# Patient Record
Sex: FEMALE | Race: BLACK OR AFRICAN AMERICAN | NOT HISPANIC OR LATINO | Employment: OTHER | URBAN - METROPOLITAN AREA
[De-identification: names, ages, dates, MRNs, and addresses within clinical notes are randomized per-mention and may not be internally consistent; named-entity substitution may affect disease eponyms.]

---

## 2019-10-14 ENCOUNTER — APPOINTMENT (EMERGENCY)
Dept: RADIOLOGY | Facility: HOSPITAL | Age: 64
End: 2019-10-14
Payer: MEDICARE

## 2019-10-14 ENCOUNTER — HOSPITAL ENCOUNTER (EMERGENCY)
Facility: HOSPITAL | Age: 64
Discharge: HOME | End: 2019-10-14
Attending: EMERGENCY MEDICINE | Admitting: EMERGENCY MEDICINE
Payer: MEDICARE

## 2019-10-14 VITALS
DIASTOLIC BLOOD PRESSURE: 74 MMHG | SYSTOLIC BLOOD PRESSURE: 153 MMHG | HEART RATE: 100 BPM | OXYGEN SATURATION: 100 % | TEMPERATURE: 98.3 F | RESPIRATION RATE: 15 BRPM

## 2019-10-14 DIAGNOSIS — L02.92 FURUNCLE: Primary | ICD-10-CM

## 2019-10-14 LAB
ALBUMIN SERPL BCP-MCNC: 4.1 G/DL (ref 3.5–5)
ALP SERPL-CCNC: 57 U/L (ref 46–116)
ALT SERPL W P-5'-P-CCNC: 15 U/L (ref 12–78)
ANION GAP SERPL CALCULATED.3IONS-SCNC: 11 MMOL/L (ref 4–13)
AST SERPL W P-5'-P-CCNC: 19 U/L (ref 5–45)
ATRIAL RATE: 88 BPM
ATRIAL RATE: 93 BPM
BASOPHILS # BLD AUTO: 0.03 THOUSANDS/ΜL (ref 0–0.1)
BASOPHILS NFR BLD AUTO: 1 % (ref 0–1)
BILIRUB SERPL-MCNC: 0.4 MG/DL (ref 0.2–1)
BUN SERPL-MCNC: 12 MG/DL (ref 5–25)
CALCIUM SERPL-MCNC: 9.5 MG/DL (ref 8.3–10.1)
CHLORIDE SERPL-SCNC: 104 MMOL/L (ref 100–108)
CO2 SERPL-SCNC: 26 MMOL/L (ref 21–32)
CREAT SERPL-MCNC: 0.93 MG/DL (ref 0.6–1.3)
EOSINOPHIL # BLD AUTO: 0.44 THOUSAND/ΜL (ref 0–0.61)
EOSINOPHIL NFR BLD AUTO: 8 % (ref 0–6)
ERYTHROCYTE [DISTWIDTH] IN BLOOD BY AUTOMATED COUNT: 13.8 % (ref 11.6–15.1)
GFR SERPL CREATININE-BSD FRML MDRD: 65 ML/MIN/1.73SQ M
GLUCOSE SERPL-MCNC: 102 MG/DL (ref 65–140)
HCT VFR BLD AUTO: 36.2 % (ref 34.8–46.1)
HGB BLD-MCNC: 11.3 G/DL (ref 11.5–15.4)
IMM GRANULOCYTES # BLD AUTO: 0.01 THOUSAND/UL (ref 0–0.2)
IMM GRANULOCYTES NFR BLD AUTO: 0 % (ref 0–2)
LYMPHOCYTES # BLD AUTO: 1.93 THOUSANDS/ΜL (ref 0.6–4.47)
LYMPHOCYTES NFR BLD AUTO: 34 % (ref 14–44)
MCH RBC QN AUTO: 28.5 PG (ref 26.8–34.3)
MCHC RBC AUTO-ENTMCNC: 31.2 G/DL (ref 31.4–37.4)
MCV RBC AUTO: 91 FL (ref 82–98)
MONOCYTES # BLD AUTO: 0.45 THOUSAND/ΜL (ref 0.17–1.22)
MONOCYTES NFR BLD AUTO: 8 % (ref 4–12)
NEUTROPHILS # BLD AUTO: 2.88 THOUSANDS/ΜL (ref 1.85–7.62)
NEUTS SEG NFR BLD AUTO: 49 % (ref 43–75)
NRBC BLD AUTO-RTO: 0 /100 WBCS
NT-PROBNP SERPL-MCNC: 148 PG/ML
P AXIS: 66 DEGREES
P AXIS: 74 DEGREES
PLATELET # BLD AUTO: 294 THOUSANDS/UL (ref 149–390)
PMV BLD AUTO: 9.8 FL (ref 8.9–12.7)
POTASSIUM SERPL-SCNC: 4.1 MMOL/L (ref 3.5–5.3)
PR INTERVAL: 160 MS
PR INTERVAL: 160 MS
PROT SERPL-MCNC: 8.5 G/DL (ref 6.4–8.2)
QRS AXIS: 27 DEGREES
QRS AXIS: 34 DEGREES
QRSD INTERVAL: 72 MS
QRSD INTERVAL: 72 MS
QT INTERVAL: 360 MS
QT INTERVAL: 374 MS
QTC INTERVAL: 447 MS
QTC INTERVAL: 452 MS
RBC # BLD AUTO: 3.96 MILLION/UL (ref 3.81–5.12)
SODIUM SERPL-SCNC: 141 MMOL/L (ref 136–145)
T WAVE AXIS: 58 DEGREES
T WAVE AXIS: 63 DEGREES
TROPONIN I SERPL-MCNC: <0.02 NG/ML
VENTRICULAR RATE: 88 BPM
VENTRICULAR RATE: 93 BPM
WBC # BLD AUTO: 5.74 THOUSAND/UL (ref 4.31–10.16)

## 2019-10-14 PROCEDURE — 99284 EMERGENCY DEPT VISIT MOD MDM: CPT

## 2019-10-14 PROCEDURE — 71046 X-RAY EXAM CHEST 2 VIEWS: CPT

## 2019-10-14 PROCEDURE — 99283 EMERGENCY DEPT VISIT LOW MDM: CPT | Performed by: EMERGENCY MEDICINE

## 2019-10-14 PROCEDURE — 84484 ASSAY OF TROPONIN QUANT: CPT | Performed by: EMERGENCY MEDICINE

## 2019-10-14 PROCEDURE — 36415 COLL VENOUS BLD VENIPUNCTURE: CPT | Performed by: EMERGENCY MEDICINE

## 2019-10-14 PROCEDURE — 80053 COMPREHEN METABOLIC PANEL: CPT | Performed by: EMERGENCY MEDICINE

## 2019-10-14 PROCEDURE — 85025 COMPLETE CBC W/AUTO DIFF WBC: CPT | Performed by: EMERGENCY MEDICINE

## 2019-10-14 PROCEDURE — 93010 ELECTROCARDIOGRAM REPORT: CPT | Performed by: INTERNAL MEDICINE

## 2019-10-14 PROCEDURE — 93005 ELECTROCARDIOGRAM TRACING: CPT

## 2019-10-14 PROCEDURE — 83880 ASSAY OF NATRIURETIC PEPTIDE: CPT | Performed by: EMERGENCY MEDICINE

## 2019-10-14 RX ORDER — CEPHALEXIN 500 MG/1
500 CAPSULE ORAL EVERY 6 HOURS SCHEDULED
Qty: 28 CAPSULE | Refills: 0 | Status: SHIPPED | OUTPATIENT
Start: 2019-10-14 | End: 2019-10-21

## 2019-10-14 RX ORDER — PIOGLITAZONEHYDROCHLORIDE 30 MG/1
30 TABLET ORAL DAILY
COMMUNITY
Start: 2019-04-17

## 2019-10-14 RX ORDER — SIMVASTATIN 40 MG
TABLET ORAL
COMMUNITY
Start: 2019-10-14

## 2019-10-14 RX ORDER — METFORMIN HYDROCHLORIDE 500 MG/1
TABLET, EXTENDED RELEASE ORAL
COMMUNITY
Start: 2019-10-14

## 2019-10-14 NOTE — ED PROVIDER NOTES
History  Chief Complaint   Patient presents with    Cyst     pt has a cyst on her right sholder, states it has had some drainage     71-year-old female presenting to the emergency department for evaluation of cyst on her back  Patient was trying on dress this with her daughter when they noticed a draining cyst on her right shoulder blade  Did drain some pus  There has been no fevers chills redness or swelling of the skin around it  Patient is diabetic  Also secondary complaint here is leg swelling  Patient has had some leg swelling worse at the end of the day for the past several months, slowly getting worse  No injury  No calf tenderness  No chest pain palpitations or shortness of breath  No orthopnea or dyspnea on exertion  Prior to Admission Medications   Prescriptions Last Dose Informant Patient Reported? Taking?   metFORMIN (GLUCOPHAGE-XR) 500 mg 24 hr tablet   Yes Yes   Sig: TAKE 4 TABLETS ONCE DAILY   pioglitazone (ACTOS) 30 mg tablet   Yes Yes   Sig: Take 30 mg by mouth daily   simvastatin (ZOCOR) 40 mg tablet   Yes Yes   Sig: TAKE 1 TABLET NIGHTLY      Facility-Administered Medications: None       Past Medical History:   Diagnosis Date    Diabetes mellitus ([facility] )        History reviewed  No pertinent surgical history  History reviewed  No pertinent family history  I have reviewed and agree with the history as documented  Social History     Tobacco Use    Smoking status: Never Smoker    Smokeless tobacco: Never Used   Substance Use Topics    Alcohol use: Never     Frequency: Never    Drug use: Never        Review of Systems   Cardiovascular: Positive for leg swelling  Skin: Positive for wound  Physical Exam  Physical Exam   Constitutional: She is oriented to person, place, and time  She appears well-developed and well-nourished  No distress  HENT:   Head: Normocephalic and atraumatic     Mouth/Throat: Oropharynx is clear and moist    Eyes: Pupils are equal, round, and reactive to light  EOM are normal    Neck: No JVD present  No tracheal deviation present  Cardiovascular: Normal rate, regular rhythm, normal heart sounds and intact distal pulses  Exam reveals no gallop and no friction rub  No murmur heard  Pulmonary/Chest: Effort normal and breath sounds normal  No respiratory distress  She has no wheezes  She has no rales  Abdominal: Soft  Bowel sounds are normal  She exhibits no distension  There is no tenderness  There is no rebound and no guarding  Neurological: She is alert and oriented to person, place, and time  No cranial nerve deficit  She exhibits normal muscle tone  Skin: Skin is warm and dry  She is not diaphoretic  No pallor  Psychiatric: She has a normal mood and affect  Her behavior is normal    Nursing note and vitals reviewed        Vital Signs  ED Triage Vitals [10/14/19 1257]   Temperature Pulse Respirations Blood Pressure SpO2   98 3 °F (36 8 °C) 100 15 153/74 100 %      Temp Source Heart Rate Source Patient Position - Orthostatic VS BP Location FiO2 (%)   Oral Monitor Sitting Left arm --      Pain Score       --           Vitals:    10/14/19 1257   BP: 153/74   Pulse: 100   Patient Position - Orthostatic VS: Sitting         Visual Acuity      ED Medications  Medications - No data to display    Diagnostic Studies  Results Reviewed     Procedure Component Value Units Date/Time    NT-BNP PRO [[id number]]  (Abnormal) Collected:  10/14/19 1435    Lab Status:  Final result Specimen:  Blood from Arm, Right Updated:  10/14/19 1506     NT-proBNP 148 pg/mL     Troponin I [[id number]]  (Normal) Collected:  10/14/19 1435    Lab Status:  Final result Specimen:  Blood from Arm, Right Updated:  10/14/19 1503     Troponin I <0 02 ng/mL     Comprehensive metabolic panel [[id number]]  (Abnormal) Collected:  10/14/19 1435    Lab Status:  Final result Specimen:  Blood from Arm, Right Updated:  10/14/19 1500     Sodium 141 mmol/L      Potassium 4 1 mmol/L Chloride 104 mmol/L      CO2 26 mmol/L      ANION GAP 11 mmol/L      BUN 12 mg/dL      Creatinine 0 93 mg/dL      Glucose 102 mg/dL      Calcium 9 5 mg/dL      AST 19 U/L      ALT 15 U/L      Alkaline Phosphatase 57 U/L      Total Protein 8 5 g/dL      Albumin 4 1 g/dL      Total Bilirubin 0 40 mg/dL      eGFR 65 ml/min/1 73sq m     Narrative:       National Kidney Disease Foundation guidelines for Chronic Kidney Disease (CKD):     Stage 1 with normal or high GFR (GFR > 90 mL/min/1 73 square meters)    Stage 2 Mild CKD (GFR = 60-89 mL/min/1 73 square meters)    Stage 3A Moderate CKD (GFR = 45-59 mL/min/1 73 square meters)    Stage 3B Moderate CKD (GFR = 30-44 mL/min/1 73 square meters)    Stage 4 Severe CKD (GFR = 15-29 mL/min/1 73 square meters)    Stage 5 End Stage CKD (GFR <15 mL/min/1 73 square meters)  Note: GFR calculation is accurate only with a steady state creatinine    CBC and differential [[id number]]  (Abnormal) Collected:  10/14/19 1435    Lab Status:  Final result Specimen:  Blood from Arm, Right Updated:  10/14/19 1442     WBC 5 74 Thousand/uL      RBC 3 96 Million/uL      Hemoglobin 11 3 g/dL      Hematocrit 36 2 %      MCV 91 fL      MCH 28 5 pg      MCHC 31 2 g/dL      RDW 13 8 %      MPV 9 8 fL      Platelets 242 Thousands/uL      nRBC 0 /100 WBCs      Neutrophils Relative 49 %      Immat GRANS % 0 %      Lymphocytes Relative 34 %      Monocytes Relative 8 %      Eosinophils Relative 8 %      Basophils Relative 1 %      Neutrophils Absolute 2 88 Thousands/µL      Immature Grans Absolute 0 01 Thousand/uL      Lymphocytes Absolute 1 93 Thousands/µL      Monocytes Absolute 0 45 Thousand/µL      Eosinophils Absolute 0 44 Thousand/µL      Basophils Absolute 0 03 Thousands/µL                  XR chest 2 views    (Results Pending)              Procedures  Procedures       ED Course                               MDM  Number of Diagnoses or Management Options  Furuncle:   Diagnosis management comments: 41-year-old with furuncle, there is no deeper abscess on ultrasound eval, will place on antibiotics given the fact she is diabetic  Will also check EKG, labs, chest x-ray, BNP given her leg swelling  If normal will discharge with PCP follow-up  Disposition  Final diagnoses:   Furuncle     Time reflects when diagnosis was documented in both MDM as applicable and the Disposition within this note     Time User Action Codes Description Comment    10/14/2019  3:19 PM Hakan Sandoval Add [R29 2] Reflex abnormality     10/14/2019  3:19 PM Morenita Sandoval Remove [R29 2] Reflex abnormality     10/14/2019  3:19 PM Lori, [address] [place] [address] [L02 92] Furuncle       ED Disposition     ED Disposition Condition Date/Time Comment    Discharge Stable Mon Oct 14, 2019  3:18 PM Venkata Peraza discharge to home/self care  Follow-up Information     Follow up With Specialties Details Why Contact Info    Fang Colon MD Internal Medicine   [address] [place] [address]  [phone]            Patient's Medications   Discharge Prescriptions    CEPHALEXIN (KEFLEX) 500 MG CAPSULE    Take 1 capsule (500 mg total) by mouth every 6 (six) hours for 7 days       Start Date: 10/14/2019End Date: 10/21/2019       Order Dose: 500 mg       Quantity: 28 capsule    Refills: 0     No discharge procedures on file      ED Provider  Electronically Signed by           Jarad Liu MD  10/14/19 2674

## 2020-01-08 ENCOUNTER — HOSPITAL ENCOUNTER (EMERGENCY)
Facility: HOSPITAL | Age: 65
Discharge: HOME | End: 2020-01-08
Attending: EMERGENCY MEDICINE
Payer: COMMERCIAL

## 2020-01-08 VITALS
DIASTOLIC BLOOD PRESSURE: 67 MMHG | HEIGHT: 63 IN | OXYGEN SATURATION: 99 % | TEMPERATURE: 98.1 F | RESPIRATION RATE: 17 BRPM | HEART RATE: 88 BPM | WEIGHT: 167 LBS | BODY MASS INDEX: 29.59 KG/M2 | SYSTOLIC BLOOD PRESSURE: 140 MMHG

## 2020-01-08 DIAGNOSIS — L25.9 CONTACT DERMATITIS: ICD-10-CM

## 2020-01-08 DIAGNOSIS — L50.9 URTICARIA: Primary | ICD-10-CM

## 2020-01-08 PROCEDURE — 99282 EMERGENCY DEPT VISIT SF MDM: CPT

## 2020-01-08 PROCEDURE — 99284 EMERGENCY DEPT VISIT MOD MDM: CPT | Performed by: EMERGENCY MEDICINE

## 2020-01-08 RX ORDER — HYDROCORTISONE 25 MG/ML
LOTION TOPICAL 2 TIMES DAILY
Qty: 50 ML | Refills: 1 | Status: ON HOLD | OUTPATIENT
Start: 2020-01-08 | End: 2020-11-29 | Stop reason: CLARIF

## 2020-01-08 RX ORDER — HYDROXYZINE HYDROCHLORIDE 25 MG/1
25 TABLET, FILM COATED ORAL EVERY 6 HOURS
Qty: 20 TABLET | Refills: 0 | Status: SHIPPED | OUTPATIENT
Start: 2020-01-08

## 2020-01-08 NOTE — ED PROVIDER NOTES
History  Chief Complaint   Patient presents with    Rash     Patient c/o rash on both thighs along with left shoulder that started last week  71 y o  F presents with a rash to bilateral thighs  This is itchy  Appears consistent with urticaria  She has no evidence of systemic anaphylaxis:  She has no shortness of breath, no wheezing, no abdominal symptoms, no nausea or vomiting  No airway constriction  She (in a disagreement with nursing note) has this rash no where else  No history of similar in the past   She admits that she bought new long underwear and that is when the symptoms started  Prior to Admission Medications   Prescriptions Last Dose Informant Patient Reported? Taking?   metFORMIN (GLUCOPHAGE-XR) 500 mg 24 hr tablet   Yes No   Sig: TAKE 4 TABLETS ONCE DAILY   pioglitazone (ACTOS) 30 mg tablet   Yes No   Sig: Take 30 mg by mouth daily   simvastatin (ZOCOR) 40 mg tablet   Yes No   Sig: TAKE 1 TABLET NIGHTLY      Facility-Administered Medications: None       Past Medical History:   Diagnosis Date    Diabetes mellitus ([facility] )        History reviewed  No pertinent surgical history  History reviewed  No pertinent family history  I have reviewed and agree with the history as documented  Social History     Tobacco Use    Smoking status: Never Smoker    Smokeless tobacco: Never Used   Substance Use Topics    Alcohol use: Never     Frequency: Never    Drug use: Never        Review of Systems   Constitutional: Negative for chills and fever  HENT: Negative for trouble swallowing  Respiratory: Negative for cough, shortness of breath and stridor  Gastrointestinal: Negative for abdominal pain, nausea and vomiting  Musculoskeletal: Negative for back pain and neck pain  Skin: Positive for rash (Urticaria bilateral thighs)  Negative for wound  Allergic/Immunologic:        Patient does not have known environmental allergies     All other systems reviewed and are negative  Physical Exam  Physical Exam   Constitutional: She is oriented to person, place, and time  She appears well-developed and well-nourished  No distress  HENT:   Head: Normocephalic and atraumatic  Mouth/Throat: Oropharynx is clear and moist    Eyes: Conjunctivae and EOM are normal    Neck: Normal range of motion  Pulmonary/Chest: Effort normal  No respiratory distress  Musculoskeletal: Normal range of motion  Neurological: She is alert and oriented to person, place, and time  No cranial nerve deficit  Skin: Skin is warm and dry  Rash (Urticaria to bilateral thighs, itchy ) noted  She is not diaphoretic  No pallor  Psychiatric: She has a normal mood and affect  Her behavior is normal    Vitals reviewed  Vital Signs  ED Triage Vitals [01/08/20 1047]   Temperature Pulse Respirations Blood Pressure SpO2   98 1 °F (36 7 °C) 88 17 140/67 99 %      Temp Source Heart Rate Source Patient Position - Orthostatic VS BP Location FiO2 (%)   Oral Monitor -- -- --      Pain Score       No Pain           Vitals:    01/08/20 1047   BP: 140/67   Pulse: 88         Visual Acuity      ED Medications  Medications - No data to display    Diagnostic Studies  Results Reviewed     None                 No orders to display              Procedures  Procedures         ED Course                               MDM  Number of Diagnoses or Management Options  Contact dermatitis:   Urticaria:   Diagnosis management comments: Contact dermatitis, allergic  Urticaria  Patient is given topical steroids, Atarax for itch  Advised good return precautions in case of signs of anaphylaxis  Advised follow-up with primary care provider for further care  She is advised to avoid the offending agent         Amount and/or Complexity of Data Reviewed  Clinical lab tests: ordered and reviewed  Tests in the radiology section of CPT®: ordered and reviewed          Disposition  Final diagnoses:   Urticaria   Contact dermatitis     Time reflects when diagnosis was documented in both MDM as applicable and the Disposition within this note     Time User Action Codes Description Comment    1/8/2020 11:25 AM KortneyToñon Nitin JORI Add [L50 9] Urticaria     1/8/2020 11:26 AM Katie Sheets Garret Add [L25 9] Contact dermatitis       ED Disposition     ED Disposition Condition Date/Time Comment    Discharge Stable Wed Jan 8, 2020 11:25 AM Katie Mcmanus discharge to home/self care  Follow-up Information     Follow up With Specialties Details Why Contact Info Additional Information    Darvin Dunlap MD Internal Medicine Schedule an appointment as soon as possible for a visit  For follow up to ensure improvement, and for further testing and treatment as needed [facility] Emergency Department Emergency Medicine  If symptoms worsen: worsening rash, trouble breathing, abdominal symtpoms, if the rash appears infected, etc [address] [facility] [address] ED, [address] [place], [place], [address]          Discharge Medication List as of 1/8/2020 11:28 AM      START taking these medications    Details   hydrocortisone 2 5 % lotion Apply topically 2 (two) times a day To the affected area for at least 5 days or longer if needed for the rash to resolve  Do not use longer than 2 weeks  , Starting Wed 1/8/2020, Print      hydrOXYzine HCL (ATARAX) 25 mg tablet Take 1 tablet (25 mg total) by mouth every 6 (six) hours As needed for itching, Starting Wed 1/8/2020, Print         CONTINUE these medications which have NOT CHANGED    Details   metFORMIN (GLUCOPHAGE-XR) 500 mg 24 hr tablet TAKE 4 TABLETS ONCE DAILY, Historical Med      pioglitazone (ACTOS) 30 mg tablet Take 30 mg by mouth daily, Starting Wed 4/17/2019, Historical Med      simvastatin (ZOCOR) 40 mg tablet TAKE 1 TABLET NIGHTLY, Historical Med           No discharge procedures on file      ED Provider  Electronically Signed by           Maribell Davison DO  01/08/20 1143

## 2020-11-16 ENCOUNTER — NURSE TRIAGE (OUTPATIENT)
Dept: OTHER | Facility: OTHER | Age: 65
End: 2020-11-16

## 2020-11-16 DIAGNOSIS — Z11.59 ENCOUNTER FOR SCREENING FOR OTHER VIRAL DISEASES: Primary | ICD-10-CM

## 2020-11-16 DIAGNOSIS — Z11.59 ENCOUNTER FOR SCREENING FOR OTHER VIRAL DISEASES: ICD-10-CM

## 2020-11-16 PROCEDURE — U0003 INFECTIOUS AGENT DETECTION BY NUCLEIC ACID (DNA OR RNA); SEVERE ACUTE RESPIRATORY SYNDROME CORONAVIRUS 2 (SARS-COV-2) (CORONAVIRUS DISEASE [COVID-19]), AMPLIFIED PROBE TECHNIQUE, MAKING USE OF HIGH THROUGHPUT TECHNOLOGIES AS DESCRIBED BY CMS-2020-01-R: HCPCS | Performed by: FAMILY MEDICINE

## 2020-11-18 LAB — SARS-COV-2 RNA SPEC QL NAA+PROBE: NOT DETECTED

## 2020-11-28 ENCOUNTER — APPOINTMENT (EMERGENCY)
Dept: RADIOLOGY | Facility: HOSPITAL | Age: 65
DRG: 101 | End: 2020-11-28
Payer: MEDICARE

## 2020-11-28 ENCOUNTER — HOSPITAL ENCOUNTER (INPATIENT)
Facility: HOSPITAL | Age: 65
LOS: 1 days | Discharge: HOME | DRG: 101 | End: 2020-11-29
Attending: EMERGENCY MEDICINE | Admitting: INTERNAL MEDICINE
Payer: MEDICARE

## 2020-11-28 DIAGNOSIS — M79.661 RIGHT CALF PAIN: ICD-10-CM

## 2020-11-28 DIAGNOSIS — R56.9 SEIZURE-LIKE ACTIVITY (HCC): Primary | ICD-10-CM

## 2020-11-28 DIAGNOSIS — M25.571 RIGHT ANKLE PAIN: ICD-10-CM

## 2020-11-28 LAB
BASOPHILS # BLD AUTO: 0.02 THOUSANDS/ΜL (ref 0–0.1)
BASOPHILS NFR BLD AUTO: 0 % (ref 0–1)
EOSINOPHIL # BLD AUTO: 0.38 THOUSAND/ΜL (ref 0–0.61)
EOSINOPHIL NFR BLD AUTO: 5 % (ref 0–6)
ERYTHROCYTE [DISTWIDTH] IN BLOOD BY AUTOMATED COUNT: 14.5 % (ref 11.6–15.1)
GLUCOSE SERPL-MCNC: 135 MG/DL (ref 65–140)
HCT VFR BLD AUTO: 32.3 % (ref 34.8–46.1)
HGB BLD-MCNC: 10.4 G/DL (ref 11.5–15.4)
IMM GRANULOCYTES # BLD AUTO: 0.03 THOUSAND/UL (ref 0–0.2)
IMM GRANULOCYTES NFR BLD AUTO: 0 % (ref 0–2)
LYMPHOCYTES # BLD AUTO: 1.17 THOUSANDS/ΜL (ref 0.6–4.47)
LYMPHOCYTES NFR BLD AUTO: 15 % (ref 14–44)
MCH RBC QN AUTO: 32 PG (ref 26.8–34.3)
MCHC RBC AUTO-ENTMCNC: 32.2 G/DL (ref 31.4–37.4)
MCV RBC AUTO: 99 FL (ref 82–98)
MONOCYTES # BLD AUTO: 0.5 THOUSAND/ΜL (ref 0.17–1.22)
MONOCYTES NFR BLD AUTO: 6 % (ref 4–12)
NEUTROPHILS # BLD AUTO: 5.86 THOUSANDS/ΜL (ref 1.85–7.62)
NEUTS SEG NFR BLD AUTO: 74 % (ref 43–75)
NRBC BLD AUTO-RTO: 0 /100 WBCS
PLATELET # BLD AUTO: 255 THOUSANDS/UL (ref 149–390)
PMV BLD AUTO: 9.9 FL (ref 8.9–12.7)
RBC # BLD AUTO: 3.25 MILLION/UL (ref 3.81–5.12)
WBC # BLD AUTO: 7.96 THOUSAND/UL (ref 4.31–10.16)

## 2020-11-28 PROCEDURE — 99285 EMERGENCY DEPT VISIT HI MDM: CPT

## 2020-11-28 PROCEDURE — 73610 X-RAY EXAM OF ANKLE: CPT

## 2020-11-28 PROCEDURE — 82948 REAGENT STRIP/BLOOD GLUCOSE: CPT

## 2020-11-28 PROCEDURE — 80048 BASIC METABOLIC PNL TOTAL CA: CPT | Performed by: PHYSICIAN ASSISTANT

## 2020-11-28 PROCEDURE — 85025 COMPLETE CBC W/AUTO DIFF WBC: CPT | Performed by: PHYSICIAN ASSISTANT

## 2020-11-28 PROCEDURE — 99285 EMERGENCY DEPT VISIT HI MDM: CPT | Performed by: PHYSICIAN ASSISTANT

## 2020-11-28 PROCEDURE — 36415 COLL VENOUS BLD VENIPUNCTURE: CPT | Performed by: PHYSICIAN ASSISTANT

## 2020-11-28 PROCEDURE — 84484 ASSAY OF TROPONIN QUANT: CPT | Performed by: PHYSICIAN ASSISTANT

## 2020-11-28 PROCEDURE — 85379 FIBRIN DEGRADATION QUANT: CPT | Performed by: PHYSICIAN ASSISTANT

## 2020-11-28 PROCEDURE — 80076 HEPATIC FUNCTION PANEL: CPT | Performed by: PHYSICIAN ASSISTANT

## 2020-11-28 PROCEDURE — 93005 ELECTROCARDIOGRAM TRACING: CPT

## 2020-11-28 PROCEDURE — 85730 THROMBOPLASTIN TIME PARTIAL: CPT | Performed by: PHYSICIAN ASSISTANT

## 2020-11-28 PROCEDURE — 85610 PROTHROMBIN TIME: CPT | Performed by: PHYSICIAN ASSISTANT

## 2020-11-28 RX ORDER — KETOROLAC TROMETHAMINE 30 MG/ML
15 INJECTION, SOLUTION INTRAMUSCULAR; INTRAVENOUS ONCE
Status: COMPLETED | OUTPATIENT
Start: 2020-11-28 | End: 2020-11-29

## 2020-11-29 ENCOUNTER — APPOINTMENT (EMERGENCY)
Dept: CT IMAGING | Facility: HOSPITAL | Age: 65
DRG: 101 | End: 2020-11-29
Payer: MEDICARE

## 2020-11-29 VITALS
BODY MASS INDEX: 27.19 KG/M2 | HEIGHT: 63 IN | DIASTOLIC BLOOD PRESSURE: 92 MMHG | SYSTOLIC BLOOD PRESSURE: 162 MMHG | WEIGHT: 153.44 LBS | TEMPERATURE: 97.9 F | OXYGEN SATURATION: 98 % | HEART RATE: 83 BPM | RESPIRATION RATE: 18 BRPM

## 2020-11-29 PROBLEM — E11.9 DM TYPE 2 (DIABETES MELLITUS, TYPE 2) (HCC): Status: ACTIVE | Noted: 2018-01-01

## 2020-11-29 PROBLEM — E11.69 HYPERLIPIDEMIA ASSOCIATED WITH TYPE 2 DIABETES MELLITUS (HCC): Status: ACTIVE | Noted: 2020-05-26

## 2020-11-29 PROBLEM — E78.5 HYPERLIPIDEMIA ASSOCIATED WITH TYPE 2 DIABETES MELLITUS (HCC): Status: ACTIVE | Noted: 2020-05-26

## 2020-11-29 PROBLEM — D64.9 ANEMIA: Status: ACTIVE | Noted: 2020-11-29

## 2020-11-29 PROBLEM — R65.10 SIRS (SYSTEMIC INFLAMMATORY RESPONSE SYNDROME) (HCC): Status: ACTIVE | Noted: 2020-11-29

## 2020-11-29 PROBLEM — R56.9 SEIZURE-LIKE ACTIVITY (HCC): Status: ACTIVE | Noted: 2020-11-29

## 2020-11-29 LAB
ALBUMIN SERPL BCP-MCNC: 3.9 G/DL (ref 3.5–5)
ALP SERPL-CCNC: 53 U/L (ref 46–116)
ALT SERPL W P-5'-P-CCNC: 16 U/L (ref 12–78)
ANION GAP SERPL CALCULATED.3IONS-SCNC: 8 MMOL/L (ref 4–13)
APTT PPP: 26 SECONDS (ref 23–37)
AST SERPL W P-5'-P-CCNC: 18 U/L (ref 5–45)
ATRIAL RATE: 89 BPM
BILIRUB DIRECT SERPL-MCNC: 0.12 MG/DL (ref 0–0.2)
BILIRUB SERPL-MCNC: 0.3 MG/DL (ref 0.2–1)
BILIRUB UR QL STRIP: NEGATIVE
BUN SERPL-MCNC: 17 MG/DL (ref 5–25)
CALCIUM SERPL-MCNC: 9.2 MG/DL (ref 8.3–10.1)
CHLORIDE SERPL-SCNC: 104 MMOL/L (ref 100–108)
CLARITY UR: CLEAR
CO2 SERPL-SCNC: 28 MMOL/L (ref 21–32)
COLOR UR: YELLOW
CREAT SERPL-MCNC: 1.15 MG/DL (ref 0.6–1.3)
D DIMER PPP FEU-MCNC: 0.29 UG/ML FEU
GFR SERPL CREATININE-BSD FRML MDRD: 57 ML/MIN/1.73SQ M
GLUCOSE SERPL-MCNC: 117 MG/DL (ref 65–140)
GLUCOSE SERPL-MCNC: 120 MG/DL (ref 65–140)
GLUCOSE SERPL-MCNC: 153 MG/DL (ref 65–140)
GLUCOSE SERPL-MCNC: 184 MG/DL (ref 65–140)
GLUCOSE UR STRIP-MCNC: NEGATIVE MG/DL
HCT VFR BLD AUTO: 33.4 % (ref 34.8–46.1)
HGB BLD-MCNC: 10.7 G/DL (ref 11.5–15.4)
HGB UR QL STRIP.AUTO: NEGATIVE
INR PPP: 0.95 (ref 0.84–1.19)
KETONES UR STRIP-MCNC: NEGATIVE MG/DL
LACTATE SERPL-SCNC: 0.8 MMOL/L (ref 0.5–2)
LEUKOCYTE ESTERASE UR QL STRIP: NEGATIVE
NITRITE UR QL STRIP: NEGATIVE
P AXIS: 78 DEGREES
PH UR STRIP.AUTO: 5.5 [PH]
POTASSIUM SERPL-SCNC: 4.4 MMOL/L (ref 3.5–5.3)
PR INTERVAL: 150 MS
PROT SERPL-MCNC: 8 G/DL (ref 6.4–8.2)
PROT UR STRIP-MCNC: NEGATIVE MG/DL
PROTHROMBIN TIME: 12.8 SECONDS (ref 11.6–14.5)
QRS AXIS: 39 DEGREES
QRSD INTERVAL: 70 MS
QT INTERVAL: 366 MS
QTC INTERVAL: 445 MS
SODIUM SERPL-SCNC: 140 MMOL/L (ref 136–145)
SP GR UR STRIP.AUTO: 1.01 (ref 1–1.03)
T WAVE AXIS: 7 DEGREES
TROPONIN I SERPL-MCNC: <0.02 NG/ML
UROBILINOGEN UR QL STRIP.AUTO: 0.2 E.U./DL
VENTRICULAR RATE: 89 BPM

## 2020-11-29 PROCEDURE — 99222 1ST HOSP IP/OBS MODERATE 55: CPT | Performed by: PSYCHIATRY & NEUROLOGY

## 2020-11-29 PROCEDURE — 85018 HEMOGLOBIN: CPT | Performed by: PHYSICIAN ASSISTANT

## 2020-11-29 PROCEDURE — 83605 ASSAY OF LACTIC ACID: CPT | Performed by: PHYSICIAN ASSISTANT

## 2020-11-29 PROCEDURE — 93010 ELECTROCARDIOGRAM REPORT: CPT | Performed by: INTERNAL MEDICINE

## 2020-11-29 PROCEDURE — 85014 HEMATOCRIT: CPT | Performed by: PHYSICIAN ASSISTANT

## 2020-11-29 PROCEDURE — 87040 BLOOD CULTURE FOR BACTERIA: CPT | Performed by: PHYSICIAN ASSISTANT

## 2020-11-29 PROCEDURE — 81003 URINALYSIS AUTO W/O SCOPE: CPT | Performed by: PHYSICIAN ASSISTANT

## 2020-11-29 PROCEDURE — 82948 REAGENT STRIP/BLOOD GLUCOSE: CPT

## 2020-11-29 PROCEDURE — 96374 THER/PROPH/DIAG INJ IV PUSH: CPT

## 2020-11-29 PROCEDURE — 70450 CT HEAD/BRAIN W/O DYE: CPT

## 2020-11-29 PROCEDURE — 96361 HYDRATE IV INFUSION ADD-ON: CPT

## 2020-11-29 PROCEDURE — 99236 HOSP IP/OBS SAME DATE HI 85: CPT | Performed by: INTERNAL MEDICINE

## 2020-11-29 PROCEDURE — G1004 CDSM NDSC: HCPCS

## 2020-11-29 RX ORDER — LORAZEPAM 2 MG/ML
2 INJECTION INTRAMUSCULAR EVERY 6 HOURS PRN
Status: DISCONTINUED | OUTPATIENT
Start: 2020-11-29 | End: 2020-11-29 | Stop reason: HOSPADM

## 2020-11-29 RX ORDER — MAGNESIUM HYDROXIDE/ALUMINUM HYDROXICE/SIMETHICONE 120; 1200; 1200 MG/30ML; MG/30ML; MG/30ML
30 SUSPENSION ORAL EVERY 6 HOURS PRN
Status: DISCONTINUED | OUTPATIENT
Start: 2020-11-29 | End: 2020-11-29 | Stop reason: HOSPADM

## 2020-11-29 RX ORDER — HYDROXYZINE HYDROCHLORIDE 25 MG/1
25 TABLET, FILM COATED ORAL EVERY 6 HOURS PRN
Status: DISCONTINUED | OUTPATIENT
Start: 2020-11-29 | End: 2020-11-29 | Stop reason: HOSPADM

## 2020-11-29 RX ORDER — ONDANSETRON 2 MG/ML
4 INJECTION INTRAMUSCULAR; INTRAVENOUS EVERY 6 HOURS PRN
Status: DISCONTINUED | OUTPATIENT
Start: 2020-11-29 | End: 2020-11-29 | Stop reason: HOSPADM

## 2020-11-29 RX ORDER — ACETAMINOPHEN 325 MG/1
650 TABLET ORAL EVERY 6 HOURS PRN
Status: DISCONTINUED | OUTPATIENT
Start: 2020-11-29 | End: 2020-11-29 | Stop reason: HOSPADM

## 2020-11-29 RX ORDER — LEVETIRACETAM 500 MG/1
500 TABLET ORAL EVERY 12 HOURS SCHEDULED
Qty: 60 TABLET | Refills: 0 | Status: SHIPPED | OUTPATIENT
Start: 2020-11-29

## 2020-11-29 RX ORDER — LEVETIRACETAM 500 MG/1
1000 TABLET ORAL ONCE
Status: COMPLETED | OUTPATIENT
Start: 2020-11-29 | End: 2020-11-29

## 2020-11-29 RX ORDER — PRAVASTATIN SODIUM 80 MG/1
80 TABLET ORAL
Status: DISCONTINUED | OUTPATIENT
Start: 2020-11-29 | End: 2020-11-29 | Stop reason: HOSPADM

## 2020-11-29 RX ADMIN — LEVETIRACETAM 1000 MG: 500 TABLET, FILM COATED ORAL at 17:55

## 2020-11-29 RX ADMIN — KETOROLAC TROMETHAMINE 15 MG: 30 INJECTION, SOLUTION INTRAMUSCULAR at 00:38

## 2020-11-29 RX ADMIN — ENOXAPARIN SODIUM 40 MG: 40 INJECTION SUBCUTANEOUS at 09:01

## 2020-11-29 RX ADMIN — PRAVASTATIN SODIUM 80 MG: 80 TABLET ORAL at 17:55

## 2020-11-29 RX ADMIN — SODIUM CHLORIDE 1000 ML: 0.9 INJECTION, SOLUTION INTRAVENOUS at 00:39

## 2020-12-05 LAB
BACTERIA BLD CULT: NORMAL
BACTERIA BLD CULT: NORMAL

## 2022-08-22 ENCOUNTER — APPOINTMENT (OUTPATIENT)
Dept: RADIOLOGY | Facility: HOSPITAL | Age: 67
End: 2022-08-22
Payer: MEDICARE

## 2022-08-22 ENCOUNTER — HOSPITAL ENCOUNTER (EMERGENCY)
Facility: HOSPITAL | Age: 67
Discharge: HOME | End: 2022-08-23
Attending: EMERGENCY MEDICINE
Payer: MEDICARE

## 2022-08-22 DIAGNOSIS — U07.1 COVID-19: Primary | ICD-10-CM

## 2022-08-22 LAB
2HR DELTA HS TROPONIN: 0 NG/L
ALBUMIN SERPL BCP-MCNC: 4 G/DL (ref 3.5–5)
ALP SERPL-CCNC: 52 U/L (ref 46–116)
ALT SERPL W P-5'-P-CCNC: 23 U/L (ref 12–78)
ANION GAP SERPL CALCULATED.3IONS-SCNC: 10 MMOL/L (ref 4–13)
AST SERPL W P-5'-P-CCNC: 29 U/L (ref 5–45)
BASOPHILS # BLD AUTO: 0.01 THOUSANDS/ΜL (ref 0–0.1)
BASOPHILS NFR BLD AUTO: 0 % (ref 0–1)
BILIRUB SERPL-MCNC: 0.6 MG/DL (ref 0.2–1)
BUN SERPL-MCNC: 13 MG/DL (ref 5–25)
CALCIUM SERPL-MCNC: 8.5 MG/DL (ref 8.3–10.1)
CARDIAC TROPONIN I PNL SERPL HS: 2 NG/L
CARDIAC TROPONIN I PNL SERPL HS: 2 NG/L
CHLORIDE SERPL-SCNC: 100 MMOL/L (ref 96–108)
CO2 SERPL-SCNC: 28 MMOL/L (ref 21–32)
CREAT SERPL-MCNC: 0.9 MG/DL (ref 0.6–1.3)
EOSINOPHIL # BLD AUTO: 0.03 THOUSAND/ΜL (ref 0–0.61)
EOSINOPHIL NFR BLD AUTO: 1 % (ref 0–6)
ERYTHROCYTE [DISTWIDTH] IN BLOOD BY AUTOMATED COUNT: 15.5 % (ref 11.6–15.1)
FLUAV RNA RESP QL NAA+PROBE: NEGATIVE
FLUBV RNA RESP QL NAA+PROBE: NEGATIVE
GFR SERPL CREATININE-BSD FRML MDRD: 65 ML/MIN/1.73SQ M
GLUCOSE SERPL-MCNC: 158 MG/DL (ref 65–140)
HCT VFR BLD AUTO: 26.5 % (ref 34.8–46.1)
HGB BLD-MCNC: 8.9 G/DL (ref 11.5–15.4)
IMM GRANULOCYTES # BLD AUTO: 0.01 THOUSAND/UL (ref 0–0.2)
IMM GRANULOCYTES NFR BLD AUTO: 0 % (ref 0–2)
LYMPHOCYTES # BLD AUTO: 0.98 THOUSANDS/ΜL (ref 0.6–4.47)
LYMPHOCYTES NFR BLD AUTO: 38 % (ref 14–44)
MCH RBC QN AUTO: 40.1 PG (ref 26.8–34.3)
MCHC RBC AUTO-ENTMCNC: 33.6 G/DL (ref 31.4–37.4)
MCV RBC AUTO: 119 FL (ref 82–98)
MONOCYTES # BLD AUTO: 0.19 THOUSAND/ΜL (ref 0.17–1.22)
MONOCYTES NFR BLD AUTO: 7 % (ref 4–12)
NEUTROPHILS # BLD AUTO: 1.36 THOUSANDS/ΜL (ref 1.85–7.62)
NEUTS SEG NFR BLD AUTO: 54 % (ref 43–75)
NRBC BLD AUTO-RTO: 0 /100 WBCS
NT-PROBNP SERPL-MCNC: 124 PG/ML
PLATELET # BLD AUTO: 122 THOUSANDS/UL (ref 149–390)
PMV BLD AUTO: 11.4 FL (ref 8.9–12.7)
POTASSIUM SERPL-SCNC: 3.7 MMOL/L (ref 3.5–5.3)
PROT SERPL-MCNC: 7.9 G/DL (ref 6.4–8.4)
RBC # BLD AUTO: 2.22 MILLION/UL (ref 3.81–5.12)
RSV RNA RESP QL NAA+PROBE: NEGATIVE
SARS-COV-2 RNA RESP QL NAA+PROBE: POSITIVE
SODIUM SERPL-SCNC: 138 MMOL/L (ref 135–147)
WBC # BLD AUTO: 2.58 THOUSAND/UL (ref 4.31–10.16)

## 2022-08-22 PROCEDURE — 99285 EMERGENCY DEPT VISIT HI MDM: CPT

## 2022-08-22 PROCEDURE — 71045 X-RAY EXAM CHEST 1 VIEW: CPT

## 2022-08-22 PROCEDURE — 85025 COMPLETE CBC W/AUTO DIFF WBC: CPT | Performed by: PHYSICIAN ASSISTANT

## 2022-08-22 PROCEDURE — 84484 ASSAY OF TROPONIN QUANT: CPT | Performed by: PHYSICIAN ASSISTANT

## 2022-08-22 PROCEDURE — 93005 ELECTROCARDIOGRAM TRACING: CPT

## 2022-08-22 PROCEDURE — 0241U HB NFCT DS VIR RESP RNA 4 TRGT: CPT | Performed by: PHYSICIAN ASSISTANT

## 2022-08-22 PROCEDURE — 80053 COMPREHEN METABOLIC PANEL: CPT | Performed by: PHYSICIAN ASSISTANT

## 2022-08-22 PROCEDURE — 99285 EMERGENCY DEPT VISIT HI MDM: CPT | Performed by: PHYSICIAN ASSISTANT

## 2022-08-22 PROCEDURE — 83880 ASSAY OF NATRIURETIC PEPTIDE: CPT | Performed by: PHYSICIAN ASSISTANT

## 2022-08-22 PROCEDURE — 36415 COLL VENOUS BLD VENIPUNCTURE: CPT | Performed by: PHYSICIAN ASSISTANT

## 2022-08-22 RX ORDER — IPRATROPIUM BROMIDE AND ALBUTEROL SULFATE .5; 3 MG/3ML; MG/3ML
1 SOLUTION RESPIRATORY (INHALATION) ONCE
Status: COMPLETED | OUTPATIENT
Start: 2022-08-22 | End: 2022-08-22

## 2022-08-22 NOTE — Clinical Note
Ranjanamickey Johnson was seen and treated in our emergency department on 8/22/2022  Diagnosis:     Angeles Lau  may return to work on return date  She may return on this date: 08/27/2022         If you have any questions or concerns, please don't hesitate to call        Sagar Dos Santos PA-C    ______________________________           _______________          _______________  Hospital Representative                              Date                                Time

## 2022-08-23 ENCOUNTER — APPOINTMENT (EMERGENCY)
Dept: CT IMAGING | Facility: HOSPITAL | Age: 67
End: 2022-08-23
Payer: MEDICARE

## 2022-08-23 VITALS
OXYGEN SATURATION: 97 % | TEMPERATURE: 99.3 F | SYSTOLIC BLOOD PRESSURE: 157 MMHG | RESPIRATION RATE: 15 BRPM | BODY MASS INDEX: 27.64 KG/M2 | DIASTOLIC BLOOD PRESSURE: 77 MMHG | WEIGHT: 156 LBS | HEART RATE: 97 BPM | HEIGHT: 63 IN

## 2022-08-23 LAB
4HR DELTA HS TROPONIN: 1 NG/L
CARDIAC TROPONIN I PNL SERPL HS: 3 NG/L
D DIMER PPP FEU-MCNC: 0.74 UG/ML FEU

## 2022-08-23 PROCEDURE — 84484 ASSAY OF TROPONIN QUANT: CPT | Performed by: PHYSICIAN ASSISTANT

## 2022-08-23 PROCEDURE — 36415 COLL VENOUS BLD VENIPUNCTURE: CPT | Performed by: PHYSICIAN ASSISTANT

## 2022-08-23 PROCEDURE — 85379 FIBRIN DEGRADATION QUANT: CPT | Performed by: PHYSICIAN ASSISTANT

## 2022-08-23 PROCEDURE — G1004 CDSM NDSC: HCPCS

## 2022-08-23 PROCEDURE — 71275 CT ANGIOGRAPHY CHEST: CPT

## 2022-08-23 RX ADMIN — IOHEXOL 75 ML: 350 INJECTION, SOLUTION INTRAVENOUS at 01:08

## 2022-08-23 NOTE — ED PROVIDER NOTES
History  Chief Complaint   Patient presents with    Shortness of Breath     Recent travel to [facility]  Potential exposure to Covid  1xDuoneb with improvement  100%RA     Patient is a 54-year-old female with a past medical history significant for diabetes presenting to the emergency department for evaluation of shortness of breath that started suddenly approximately 1 hour ago after she woke up from a nap  Patient's symptoms are currently completely resolved, no longer feeling short of breath  Of note, patient does report recent travel to [place], unsure if she was exposed to U4EA  She does endorse nasal congestion, lower extremity edema bilaterally  She is not having any chest pain, abdominal pain, nausea, vomiting, diarrhea  No other complaints at this time  Prior to Admission Medications   Prescriptions Last Dose Informant Patient Reported? Taking?   hydrOXYzine HCL (ATARAX) 25 mg tablet   No No   Sig: Take 1 tablet (25 mg total) by mouth every 6 (six) hours As needed for itching   levETIRAcetam (KEPPRA) 500 mg tablet   No No   Sig: Take 1 tablet (500 mg total) by mouth every 12 (twelve) hours   metFORMIN (GLUCOPHAGE-XR) 500 mg 24 hr tablet   Yes No   Sig: TAKE 4 TABLETS ONCE DAILY   pioglitazone (ACTOS) 30 mg tablet   Yes No   Sig: Take 30 mg by mouth daily   simvastatin (ZOCOR) 40 mg tablet   Yes No   Sig: TAKE 1 TABLET NIGHTLY      Facility-Administered Medications: None       Past Medical History:   Diagnosis Date    Diabetes mellitus ([facility] [address] )        History reviewed  No pertinent surgical history  History reviewed  No pertinent family history  I have reviewed and agree with the history as documented  E-Cigarette/Vaping     E-Cigarette/Vaping Substances     Social History     Tobacco Use    Smoking status: Never Smoker    Smokeless tobacco: Never Used   Substance Use Topics    Alcohol use: Never    Drug use: Never       Review of Systems   Constitutional: Negative for chills and fever  HENT: Negative for congestion, facial swelling, nosebleeds, sore throat and voice change  Eyes: Negative for pain and redness  Respiratory: Positive for shortness of breath  Negative for cough, choking, chest tightness and stridor  Cardiovascular: Positive for leg swelling  Negative for chest pain and palpitations  Gastrointestinal: Negative for abdominal pain, diarrhea, nausea and vomiting  Musculoskeletal: Negative for arthralgias, back pain, myalgias, neck pain and neck stiffness  Skin: Negative for color change and rash  Neurological: Negative for dizziness, syncope, facial asymmetry, weakness, light-headedness, numbness and headaches  Psychiatric/Behavioral: Negative for confusion and suicidal ideas  All other systems reviewed and are negative  Physical Exam  Physical Exam  Vitals and nursing note reviewed  Constitutional:       General: She is not in acute distress  Appearance: Normal appearance  She is well-developed  She is obese  She is not ill-appearing, toxic-appearing or diaphoretic  HENT:      Head: Normocephalic and atraumatic  Right Ear: External ear normal       Left Ear: External ear normal       Nose: Nose normal  No congestion or rhinorrhea  Mouth/Throat:      Mouth: Mucous membranes are moist       Pharynx: Oropharynx is clear  No oropharyngeal exudate or posterior oropharyngeal erythema  Eyes:      General: No scleral icterus  Right eye: No discharge  Left eye: No discharge  Extraocular Movements: Extraocular movements intact  Conjunctiva/sclera: Conjunctivae normal    Cardiovascular:      Rate and Rhythm: Normal rate and regular rhythm  Pulses: Normal pulses  Heart sounds: Normal heart sounds  No murmur heard  No friction rub  No gallop  Pulmonary:      Effort: Pulmonary effort is normal  No respiratory distress  Breath sounds: Normal breath sounds  No stridor  No wheezing, rhonchi or rales     Abdominal: General: Abdomen is flat  Palpations: Abdomen is soft  Tenderness: There is no abdominal tenderness  There is no guarding or rebound  Musculoskeletal:      Cervical back: Normal range of motion and neck supple  Right lower leg: Edema present  Left lower leg: Edema present  Skin:     General: Skin is warm and dry  Capillary Refill: Capillary refill takes less than 2 seconds  Neurological:      General: No focal deficit present  Mental Status: She is alert and oriented to person, place, and time     Psychiatric:         Mood and Affect: Mood normal          Behavior: Behavior normal          Vital Signs  ED Triage Vitals   Temperature Pulse Respirations Blood Pressure SpO2   08/22/22 2125 08/22/22 2125 08/22/22 2125 08/22/22 2125 08/22/22 2123   99 3 °F (37 4 °C) 104 18 147/75 100 %      Temp Source Heart Rate Source Patient Position - Orthostatic VS BP Location FiO2 (%)   08/22/22 2125 08/22/22 2125 08/22/22 2125 08/22/22 2125 --   Oral Monitor Lying Right arm       Pain Score       08/22/22 2125       No Pain           Vitals:    08/22/22 2125 08/22/22 2324 08/23/22 0126 08/23/22 0200   BP: 147/75 145/73 165/76 157/77   Pulse: 104 96 100 97   Patient Position - Orthostatic VS: Lying            Visual Acuity      ED Medications  Medications   ipratropium-albuterol (FOR EMS ONLY) (DUO-NEB) 0 5-2 5 mg/3 mL inhalation solution 3 mL (0 mL Does not apply Given to EMS 8/22/22 2200)   iohexol (OMNIPAQUE) 350 MG/ML injection (MULTI-DOSE) 75 mL (75 mL Intravenous Given 8/23/22 0108)       Diagnostic Studies  Results Reviewed     Procedure Component Value Units Date/Time    HS Troponin I 4hr [[id number]]  (Normal) Collected: 08/23/22 0122    Lab Status: Final result Specimen: Blood from Arm, Right Updated: 08/23/22 0154     hs TnI 4hr 3 ng/L      Delta 4hr hsTnI 1 ng/L     D-Dimer [[id number]]  (Abnormal) Collected: 08/23/22 0008    Lab Status: Final result Specimen: Blood from Arm, Right Updated: 08/23/22 0030     D-Dimer, Quant 0 74 ug/ml FEU     Narrative: In the evaluation for possible pulmonary embolism, in the appropriate (Well's Score of 4 or less) patient, the age adjusted d-dimer cutoff for this patient can be calculated as:    Age x 0 01 (in ug/mL) for Age-adjusted D-dimer exclusion threshold for a patient over 50 years      HS Troponin I 2hr [[id number]]  (Normal) Collected: 08/22/22 2315    Lab Status: Final result Specimen: Blood from Arm, Right Updated: 08/22/22 2343     hs TnI 2hr 2 ng/L      Delta 2hr hsTnI 0 ng/L     CBC and differential [[id number]]  (Abnormal) Collected: 08/22/22 2201    Lab Status: Final result Specimen: Blood from Arm, Left Updated: 08/22/22 2240     WBC 2 58 Thousand/uL      RBC 2 22 Million/uL      Hemoglobin 8 9 g/dL      Hematocrit 26 5 %       fL      MCH 40 1 pg      MCHC 33 6 g/dL      RDW 15 5 %      MPV 11 4 fL      Platelets 711 Thousands/uL      nRBC 0 /100 WBCs      Neutrophils Relative 54 %      Immat GRANS % 0 %      Lymphocytes Relative 38 %      Monocytes Relative 7 %      Eosinophils Relative 1 %      Basophils Relative 0 %      Neutrophils Absolute 1 36 Thousands/µL      Immature Grans Absolute 0 01 Thousand/uL      Lymphocytes Absolute 0 98 Thousands/µL      Monocytes Absolute 0 19 Thousand/µL      Eosinophils Absolute 0 03 Thousand/µL      Basophils Absolute 0 01 Thousands/µL     HS Troponin 0hr (reflex protocol) [[id number]]  (Normal) Collected: 08/22/22 2201    Lab Status: Final result Specimen: Blood from Arm, Left Updated: 08/22/22 2232     hs TnI 0hr 2 ng/L     NT-BNP PRO [[id number]]  (Normal) Collected: 08/22/22 2201    Lab Status: Final result Specimen: Blood from Arm, Left Updated: 08/22/22 2231     NT-proBNP 124 pg/mL     Comprehensive metabolic panel [[id number]]  (Abnormal) Collected: 08/22/22 2201    Lab Status: Final result Specimen: Blood from Arm, Left Updated: 08/22/22 2225     Sodium 138 mmol/L      Potassium 3 7 mmol/L Chloride 100 mmol/L      CO2 28 mmol/L      ANION GAP 10 mmol/L      BUN 13 mg/dL      Creatinine 0 90 mg/dL      Glucose 158 mg/dL      Calcium 8 5 mg/dL      AST 29 U/L      ALT 23 U/L      Alkaline Phosphatase 52 U/L      Total Protein 7 9 g/dL      Albumin 4 0 g/dL      Total Bilirubin 0 60 mg/dL      eGFR 65 ml/min/1 73sq m     Narrative:      [place] guidelines for Chronic Kidney Disease (CKD):     Stage 1 with normal or high GFR (GFR > 90 mL/min/1 73 square meters)    Stage 2 Mild CKD (GFR = 60-89 mL/min/1 73 square meters)    Stage 3A Moderate CKD (GFR = 45-59 mL/min/1 73 square meters)    Stage 3B Moderate CKD (GFR = 30-44 mL/min/1 73 square meters)    Stage 4 Severe CKD (GFR = 15-29 mL/min/1 73 square meters)    Stage 5 End Stage CKD (GFR <15 mL/min/1 73 square meters)  Note: GFR calculation is accurate only with a steady state creatinine    FLU/RSV/COVID - if FLU/RSV clinically relevant [[id number]]  (Abnormal) Collected: 08/22/22 2135    Lab Status: Final result Specimen: Nares from Nose Updated: 08/22/22 2222     SARS-CoV-2 Positive     INFLUENZA A PCR Negative     INFLUENZA B PCR Negative     RSV PCR Negative    Narrative:      FOR PEDIATRIC PATIENTS - copy/paste COVID Guidelines URL to browser: [email or web address]    SARS-CoV-2 assay is a Nucleic Acid Amplification assay intended for the  qualitative detection of nucleic acid from SARS-CoV-2 in nasopharyngeal  swabs  Results are for the presumptive identification of SARS-CoV-2 RNA  Positive results are indicative of infection with SARS-CoV-2, the virus  causing COVID-19, but do not rule out bacterial infection or co-infection  with other viruses  Laboratories within the [place] and its  territories are required to report all positive results to the appropriate  public health authorities   Negative results do not preclude SARS-CoV-2  infection and should not be used as the sole basis for treatment or other  patient management decisions  Negative results must be combined with  clinical observations, patient history, and epidemiological information  This test has not been FDA cleared or approved  This test has been authorized by FDA under an Emergency Use Authorization  (EUA)  This test is only authorized for the duration of time the  declaration that circumstances exist justifying the authorization of the  emergency use of an in vitro diagnostic tests for detection of SARS-CoV-2  virus and/or diagnosis of COVID-19 infection under section 564(b)(1) of  the Act, 21 U  S C  208ZXV-1(D)(3), unless the authorization is terminated  or revoked sooner  The test has been validated but independent review by FDA  and CLIA is pending  Test performed using [employer] GeneXpert: This RT-PCR assay targets N2,  a region unique to SARS-CoV-2  A conserved region in the E-gene was chosen  for pan-Sarbecovirus detection which includes SARS-CoV-2  CTA ED chest PE Study   Final Result by Jaylen Pulido MD (08/23 4968)      No intraluminal filling defect to suggest an acute pulmonary embolus  No infiltrate or pleural effusion  Workstation performed: [id number]         XR chest 1 view portable   Final Result by Ny Busby MD (08/23 6837)      No acute cardiopulmonary disease  Workstation performed: [id number]                    Procedures  Procedures         ED Course                               SBIRT 22yo+    Flowsheet Row Most Recent Value   SBIRT (23 yo +)    In order to provide better care to our patients, we are screening all of our patients for alcohol and drug use  Would it be okay to ask you these screening questions? Yes Filed at: 08/22/2022 2139   Initial Alcohol Screen: US AUDIT-C     1  How often do you have a drink containing alcohol? 0 Filed at: 08/22/2022 2139   2   How many drinks containing alcohol do you have on a typical day you are drinking? 0 Filed at: 08/22/2022 2139   3a  Male UNDER 65: How often do you have five or more drinks on one occasion? 0 Filed at: 08/22/2022 2139   3b  FEMALE Any Age, or MALE 65+: How often do you have 4 or more drinks on one occassion? 0 Filed at: 08/22/2022 2139   Audit-C Score 0 Filed at: 08/22/2022 2139   [place]: How many times in the past year have you    Used an illegal drug or used a prescription medication for non-medical reasons? Never Filed at: 08/22/2022 2139                    [facility]  Number of Diagnoses or Management Options  COVID-19  Diagnosis management comments: Patient presenting for evaluation of shortness of breath in the setting of recent travel  She also has bilateral lower extremity edema, there is concern for PE given tachycardia, shortness of breath, recent travel  Lung sounds clear to auscultation bilaterally  D-dimer elevated, PE study obtained do not reveal any acute filling defects  Patient maintaining oxygenation throughout her emergency department visit  Lab work overall reassuring, patient did test positive for COVID  She was discharged home with instructions to follow-up with her primary care provider  Strict return precautions were discussed  She is in stable condition at time of discharge  Amount and/or Complexity of Data Reviewed  Clinical lab tests: ordered and reviewed  Tests in the radiology section of CPT®: ordered and reviewed    Patient Progress  Patient progress: stable      Disposition  Final diagnoses:   COVID-19     Time reflects when diagnosis was documented in both MDM as applicable and the Disposition within this note     Time User Action Codes Description Comment    8/23/2022  2:44 AM Rosario Martinez [U07 1] COVID-19       ED Disposition     ED Disposition   Discharge    Condition   Stable    Date/Time   Tue Aug 23, 2022  2:43 AM    Kesha Escamilla discharge to home/self care                 Follow-up Information     Follow up With Specialties Details Why Contact Info Additional [address] [facility] Emergency Department Emergency Medicine Go to  If symptoms worsen [address] [facility] [id number] [facility] Emergency Department, [address], [place], [address] [place] [address], MD Internal Medicine Schedule an appointment as soon as possible for a visit  for follow up [address] [facility]  [phone]             Discharge Medication List as of 8/23/2022  2:54 AM      CONTINUE these medications which have NOT CHANGED    Details   hydrOXYzine HCL (ATARAX) 25 mg tablet Take 1 tablet (25 mg total) by mouth every 6 (six) hours As needed for itching, Starting Wed 1/8/2020, Print      levETIRAcetam (KEPPRA) 500 mg tablet Take 1 tablet (500 mg total) by mouth every 12 (twelve) hours, Starting Sun 11/29/2020, Normal      metFORMIN (GLUCOPHAGE-XR) 500 mg 24 hr tablet TAKE 4 TABLETS ONCE DAILY, Historical Med      pioglitazone (ACTOS) 30 mg tablet Take 30 mg by mouth daily, Starting Wed 4/17/2019, Historical Med      simvastatin (ZOCOR) 40 mg tablet TAKE 1 TABLET NIGHTLY, Historical Med             No discharge procedures on file      PDMP Review     None          ED Provider  Electronically Signed by           Karen Cho PA-C  08/28/22 2965

## 2022-08-24 LAB
ATRIAL RATE: 101 BPM
P AXIS: 72 DEGREES
PR INTERVAL: 154 MS
QRS AXIS: 15 DEGREES
QRSD INTERVAL: 72 MS
QT INTERVAL: 342 MS
QTC INTERVAL: 443 MS
T WAVE AXIS: 42 DEGREES
VENTRICULAR RATE: 101 BPM

## 2022-08-24 PROCEDURE — 93010 ELECTROCARDIOGRAM REPORT: CPT | Performed by: INTERNAL MEDICINE

## 2023-01-30 ENCOUNTER — APPOINTMENT (EMERGENCY)
Dept: RADIOLOGY | Facility: HOSPITAL | Age: 68
End: 2023-01-30

## 2023-01-30 ENCOUNTER — APPOINTMENT (OUTPATIENT)
Dept: CT IMAGING | Facility: HOSPITAL | Age: 68
End: 2023-01-30

## 2023-01-30 ENCOUNTER — HOSPITAL ENCOUNTER (INPATIENT)
Facility: HOSPITAL | Age: 68
LOS: 1 days | Discharge: HOME HEALTH | End: 2023-02-01
Attending: EMERGENCY MEDICINE | Admitting: INTERNAL MEDICINE

## 2023-01-30 DIAGNOSIS — D64.9 ANEMIA: ICD-10-CM

## 2023-01-30 DIAGNOSIS — J18.9 PNEUMONIA: Primary | ICD-10-CM

## 2023-01-30 DIAGNOSIS — U07.1 COVID: ICD-10-CM

## 2023-01-30 PROBLEM — D61.818 PANCYTOPENIA (HCC): Status: ACTIVE | Noted: 2020-11-29

## 2023-01-30 PROBLEM — A41.9 SEPSIS (HCC): Status: ACTIVE | Noted: 2020-11-29

## 2023-01-30 LAB
ABO GROUP BLD: NORMAL
ABO GROUP BLD: NORMAL
ALBUMIN SERPL BCP-MCNC: 3.6 G/DL (ref 3.5–5)
ALP SERPL-CCNC: 51 U/L (ref 46–116)
ALT SERPL W P-5'-P-CCNC: 25 U/L (ref 12–78)
ANION GAP SERPL CALCULATED.3IONS-SCNC: 12 MMOL/L (ref 4–13)
AST SERPL W P-5'-P-CCNC: 47 U/L (ref 5–45)
BASOPHILS # BLD AUTO: 0 THOUSANDS/ÂΜL (ref 0–0.1)
BASOPHILS NFR BLD AUTO: 0 % (ref 0–1)
BILIRUB SERPL-MCNC: 1.29 MG/DL (ref 0.2–1)
BLD GP AB SCN SERPL QL: NEGATIVE
BUN SERPL-MCNC: 13 MG/DL (ref 5–25)
CALCIUM SERPL-MCNC: 8.7 MG/DL (ref 8.3–10.1)
CHLORIDE SERPL-SCNC: 98 MMOL/L (ref 96–108)
CO2 SERPL-SCNC: 24 MMOL/L (ref 21–32)
CREAT SERPL-MCNC: 0.88 MG/DL (ref 0.6–1.3)
EOSINOPHIL # BLD AUTO: 0.01 THOUSAND/ÂΜL (ref 0–0.61)
EOSINOPHIL NFR BLD AUTO: 0 % (ref 0–6)
ERYTHROCYTE [DISTWIDTH] IN BLOOD BY AUTOMATED COUNT: 18.8 % (ref 11.6–15.1)
GFR SERPL CREATININE-BSD FRML MDRD: 67 ML/MIN/1.73SQ M
GLUCOSE SERPL-MCNC: 158 MG/DL (ref 65–140)
HCT VFR BLD AUTO: 17.9 % (ref 34.8–46.1)
HGB BLD-MCNC: 6 G/DL (ref 11.5–15.4)
IMM GRANULOCYTES # BLD AUTO: 0.01 THOUSAND/UL (ref 0–0.2)
IMM GRANULOCYTES NFR BLD AUTO: 0 % (ref 0–2)
LACTATE SERPL-SCNC: 1.4 MMOL/L (ref 0.5–2)
LYMPHOCYTES # BLD AUTO: 1.09 THOUSANDS/ÂΜL (ref 0.6–4.47)
LYMPHOCYTES NFR BLD AUTO: 30 % (ref 14–44)
MCH RBC QN AUTO: 44.9 PG (ref 26.8–34.3)
MCHC RBC AUTO-ENTMCNC: 34.1 G/DL (ref 31.4–37.4)
MCV RBC AUTO: 132 FL (ref 82–98)
MONOCYTES # BLD AUTO: 0.15 THOUSAND/ÂΜL (ref 0.17–1.22)
MONOCYTES NFR BLD AUTO: 4 % (ref 4–12)
NEUTROPHILS # BLD AUTO: 2.32 THOUSANDS/ÂΜL (ref 1.85–7.62)
NEUTS SEG NFR BLD AUTO: 66 % (ref 43–75)
NRBC BLD AUTO-RTO: 1 /100 WBCS
PLATELET # BLD AUTO: 96 THOUSANDS/UL (ref 149–390)
PMV BLD AUTO: 11.7 FL (ref 8.9–12.7)
POTASSIUM SERPL-SCNC: 4.4 MMOL/L (ref 3.5–5.3)
PROCALCITONIN SERPL-MCNC: 0.3 NG/ML
PROT SERPL-MCNC: 7.9 G/DL (ref 6.4–8.4)
RBC # BLD AUTO: 1.36 MILLION/UL (ref 3.81–5.12)
RH BLD: POSITIVE
RH BLD: POSITIVE
SODIUM SERPL-SCNC: 134 MMOL/L (ref 135–147)
SPECIMEN EXPIRATION DATE: NORMAL
WBC # BLD AUTO: 3.58 THOUSAND/UL (ref 4.31–10.16)

## 2023-01-30 RX ORDER — DOCUSATE SODIUM 100 MG/1
100 CAPSULE, LIQUID FILLED ORAL 2 TIMES DAILY
Status: DISCONTINUED | OUTPATIENT
Start: 2023-01-30 | End: 2023-02-01 | Stop reason: HOSPADM

## 2023-01-30 RX ORDER — ACETAMINOPHEN 325 MG/1
650 TABLET ORAL ONCE
Status: COMPLETED | OUTPATIENT
Start: 2023-01-30 | End: 2023-01-30

## 2023-01-30 RX ORDER — LEVOFLOXACIN 5 MG/ML
750 INJECTION, SOLUTION INTRAVENOUS ONCE
Status: COMPLETED | OUTPATIENT
Start: 2023-01-30 | End: 2023-01-30

## 2023-01-30 RX ORDER — ONDANSETRON 2 MG/ML
4 INJECTION INTRAMUSCULAR; INTRAVENOUS EVERY 6 HOURS PRN
Status: DISCONTINUED | OUTPATIENT
Start: 2023-01-30 | End: 2023-02-01 | Stop reason: HOSPADM

## 2023-01-30 RX ORDER — LEVETIRACETAM 500 MG/1
500 TABLET ORAL EVERY 12 HOURS SCHEDULED
Status: DISCONTINUED | OUTPATIENT
Start: 2023-01-30 | End: 2023-02-01 | Stop reason: HOSPADM

## 2023-01-30 RX ORDER — CALCIUM CARBONATE 200(500)MG
1000 TABLET,CHEWABLE ORAL DAILY PRN
Status: DISCONTINUED | OUTPATIENT
Start: 2023-01-30 | End: 2023-02-01 | Stop reason: HOSPADM

## 2023-01-30 RX ORDER — SODIUM CHLORIDE 9 MG/ML
3 INJECTION INTRAVENOUS EVERY 8 HOURS SCHEDULED
Status: DISCONTINUED | OUTPATIENT
Start: 2023-01-30 | End: 2023-02-01 | Stop reason: HOSPADM

## 2023-01-30 RX ORDER — SENNOSIDES 8.6 MG
1 TABLET ORAL DAILY
Status: DISCONTINUED | OUTPATIENT
Start: 2023-01-31 | End: 2023-02-01 | Stop reason: HOSPADM

## 2023-01-30 RX ORDER — ACETAMINOPHEN 325 MG/1
650 TABLET ORAL EVERY 6 HOURS PRN
Status: DISCONTINUED | OUTPATIENT
Start: 2023-01-30 | End: 2023-02-01 | Stop reason: HOSPADM

## 2023-01-30 RX ADMIN — LEVOFLOXACIN 750 MG: 750 INJECTION, SOLUTION INTRAVENOUS at 18:28

## 2023-01-30 RX ADMIN — DOCUSATE SODIUM 100 MG: 100 CAPSULE, LIQUID FILLED ORAL at 21:21

## 2023-01-30 RX ADMIN — CEFTRIAXONE SODIUM 1000 MG: 10 INJECTION, POWDER, FOR SOLUTION INTRAVENOUS at 21:20

## 2023-01-30 RX ADMIN — SODIUM CHLORIDE 3 ML: 9 INJECTION INTRAMUSCULAR; INTRAVENOUS; SUBCUTANEOUS at 18:28

## 2023-01-30 RX ADMIN — SODIUM CHLORIDE 1000 ML: 0.9 INJECTION, SOLUTION INTRAVENOUS at 18:29

## 2023-01-30 RX ADMIN — SODIUM CHLORIDE 1000 ML: 0.9 INJECTION, SOLUTION INTRAVENOUS at 19:02

## 2023-01-30 RX ADMIN — LEVETIRACETAM 500 MG: 500 TABLET, FILM COATED ORAL at 21:21

## 2023-01-30 RX ADMIN — ACETAMINOPHEN 650 MG: 325 TABLET, FILM COATED ORAL at 18:28

## 2023-01-30 RX ADMIN — SODIUM CHLORIDE 1000 ML: 0.9 INJECTION, SOLUTION INTRAVENOUS at 20:55

## 2023-01-30 RX ADMIN — IOHEXOL 100 ML: 350 INJECTION, SOLUTION INTRAVENOUS at 21:27

## 2023-01-30 RX ADMIN — SODIUM CHLORIDE 3 ML: 9 INJECTION INTRAMUSCULAR; INTRAVENOUS; SUBCUTANEOUS at 21:44

## 2023-01-30 NOTE — ED PROVIDER NOTES
History  Chief Complaint   Patient presents with   • Altered Mental Status     Daughter reports pt tested positive today for covid, symptoms started last week  She noticed today when she came home from [facility] that pt wasn't acting herself and was seemed slower than normal and confused  5th day of symptoms  General malaise and severe diarrhea that has improved today  Today was acting sluggish, w/ AMS  Went to urgent care where she tested + COVID  Sent to ER for further eval     PMhx: only + DM  Sx: none  Non smoker non drinker  History provided by:  Patient and relative   used: No    Altered Mental Status  Presenting symptoms: disorientation and lethargy    Presenting symptoms: no memory loss    Severity:  Mild  Associated symptoms: fever and weakness    Associated symptoms: no abdominal pain, no palpitations, no rash, no seizures and no vomiting        Prior to Admission Medications   Prescriptions Last Dose Informant Patient Reported? Taking?   hydrOXYzine HCL (ATARAX) 25 mg tablet   No No   Sig: Take 1 tablet (25 mg total) by mouth every 6 (six) hours As needed for itching   levETIRAcetam (KEPPRA) 500 mg tablet   No No   Sig: Take 1 tablet (500 mg total) by mouth every 12 (twelve) hours   metFORMIN (GLUCOPHAGE-XR) 500 mg 24 hr tablet   Yes No   Sig: TAKE 4 TABLETS ONCE DAILY   pioglitazone (ACTOS) 30 mg tablet   Yes No   Sig: Take 30 mg by mouth daily   simvastatin (ZOCOR) 40 mg tablet   Yes No   Sig: TAKE 1 TABLET NIGHTLY      Facility-Administered Medications: None       Past Medical History:   Diagnosis Date   • Diabetes mellitus ([facility] [address] )        History reviewed  No pertinent surgical history  History reviewed  No pertinent family history  I have reviewed and agree with the history as documented      E-Cigarette/Vaping   • E-Cigarette Use Never User      E-Cigarette/Vaping Substances     Social History     Tobacco Use   • Smoking status: Never   • Smokeless tobacco: Never   Vaping Use   • Vaping Use: Never used   Substance Use Topics   • Alcohol use: Never   • Drug use: Never       Review of Systems   Constitutional: Positive for fatigue and fever  Negative for chills  HENT: Negative for ear pain and sore throat  Eyes: Negative for pain and visual disturbance  Respiratory: Positive for cough  Negative for apnea, shortness of breath, wheezing and stridor  Cardiovascular: Negative for chest pain and palpitations  Gastrointestinal: Positive for diarrhea  Negative for abdominal pain and vomiting  Genitourinary: Negative for dysuria and hematuria  Musculoskeletal: Negative for arthralgias and back pain  Skin: Negative for color change and rash  Neurological: Positive for weakness  Negative for seizures and syncope  Psychiatric/Behavioral: Negative for memory loss  All other systems reviewed and are negative  Physical Exam  Physical Exam  Vitals and nursing note reviewed  Constitutional:       General: She is not in acute distress  Appearance: She is well-developed and normal weight  HENT:      Head: Normocephalic and atraumatic  Mouth/Throat:      Mouth: Mucous membranes are moist    Eyes:      Extraocular Movements: Extraocular movements intact  Conjunctiva/sclera: Conjunctivae normal    Cardiovascular:      Rate and Rhythm: Normal rate and regular rhythm  Heart sounds: Normal heart sounds  No murmur heard  Pulmonary:      Effort: Pulmonary effort is normal  No respiratory distress  Breath sounds: Normal breath sounds  Abdominal:      Palpations: Abdomen is soft  Tenderness: There is no abdominal tenderness  Musculoskeletal:         General: No swelling  Normal range of motion  Cervical back: Normal range of motion and neck supple  Skin:     General: Skin is warm and dry  Capillary Refill: Capillary refill takes less than 2 seconds     Neurological:      Mental Status: She is alert and oriented to person, place, and time       Cranial Nerves: No cranial nerve deficit or facial asymmetry  Sensory: No sensory deficit  Motor: Weakness present     Psychiatric:         Mood and Affect: Mood normal          Speech: Speech normal          Vital Signs  ED Triage Vitals [01/30/23 1644]   Temperature Pulse Respirations Blood Pressure SpO2   (!) 101 4 °F (38 6 °C) (!) 116 14 133/73 100 %      Temp Source Heart Rate Source Patient Position - Orthostatic VS BP Location FiO2 (%)   Oral Monitor Sitting Right arm --      Pain Score       No Pain           Vitals:    01/30/23 1644 01/30/23 1800 01/30/23 1900   BP: 133/73  113/58   Pulse: (!) 116 (!) 109 (!) 108   Patient Position - Orthostatic VS: Sitting           Visual Acuity      ED Medications  Medications   sodium chloride (PF) 0 9 % injection 3 mL (3 mL Intravenous Given 1/30/23 1828)   sodium chloride 0 9 % bolus 1,000 mL (0 mL Intravenous Stopped 1/30/23 1903)     Followed by   sodium chloride 0 9 % bolus 1,000 mL (1,000 mL Intravenous New Bag 1/30/23 1902)     Followed by   sodium chloride 0 9 % bolus 1,000 mL (has no administration in time range)   levofloxacin (LEVAQUIN) IVPB (premix in dextrose) 750 mg 150 mL (750 mg Intravenous New Bag 1/30/23 1828)   acetaminophen (TYLENOL) tablet 650 mg (650 mg Oral Given 1/30/23 1828)       Diagnostic Studies  Results Reviewed     Procedure Component Value Units Date/Time    CBC and differential [[id number]]  (Abnormal) Collected: 01/30/23 1821    Lab Status: Final result Specimen: Blood from Arm, Left Updated: 01/30/23 1902     WBC 3 58 Thousand/uL      RBC 1 36 Million/uL      Hemoglobin 6 0 g/dL      Hematocrit 17 9 %       fL      MCH 44 9 pg      MCHC 34 1 g/dL      RDW 18 8 %      MPV 11 7 fL      Platelets 96 Thousands/uL      nRBC 1 /100 WBCs      Neutrophils Relative 66 %      Immat GRANS % 0 %      Lymphocytes Relative 30 %      Monocytes Relative 4 %      Eosinophils Relative 0 %      Basophils Relative 0 % Neutrophils Absolute 2 32 Thousands/µL      Immature Grans Absolute 0 01 Thousand/uL      Lymphocytes Absolute 1 09 Thousands/µL      Monocytes Absolute 0 15 Thousand/µL      Eosinophils Absolute 0 01 Thousand/µL      Basophils Absolute 0 00 Thousands/µL     Procalcitonin [[id number]]  (Abnormal) Collected: 01/30/23 1821    Lab Status: Final result Specimen: Blood from Arm, Left Updated: 01/30/23 1900     Procalcitonin 0 30 ng/ml     Lactic acid [[id number]]  (Normal) Collected: 01/30/23 1825    Lab Status: Final result Specimen: Blood from Hand, Right Updated: 01/30/23 1852     LACTIC ACID 1 4 mmol/L     Narrative:      Result may be elevated if tourniquet was used during collection  Comprehensive metabolic panel [[id number]]  (Abnormal) Collected: 01/30/23 1821    Lab Status: Final result Specimen: Blood from Arm, Left Updated: 01/30/23 1849     Sodium 134 mmol/L      Potassium 4 4 mmol/L      Chloride 98 mmol/L      CO2 24 mmol/L      ANION GAP 12 mmol/L      BUN 13 mg/dL      Creatinine 0 88 mg/dL      Glucose 158 mg/dL      Calcium 8 7 mg/dL      AST 47 U/L      ALT 25 U/L      Alkaline Phosphatase 51 U/L      Total Protein 7 9 g/dL      Albumin 3 6 g/dL      Total Bilirubin 1 29 mg/dL      eGFR 67 ml/min/1 73sq m     Narrative:      [place] guidelines for Chronic Kidney Disease (CKD):   •  Stage 1 with normal or high GFR (GFR > 90 mL/min/1 73 square meters)  •  Stage 2 Mild CKD (GFR = 60-89 mL/min/1 73 square meters)  •  Stage 3A Moderate CKD (GFR = 45-59 mL/min/1 73 square meters)  •  Stage 3B Moderate CKD (GFR = 30-44 mL/min/1 73 square meters)  •  Stage 4 Severe CKD (GFR = 15-29 mL/min/1 73 square meters)  •  Stage 5 End Stage CKD (GFR <15 mL/min/1 73 square meters)  Note: GFR calculation is accurate only with a steady state creatinine    Blood culture #2 [[id number]] Collected: 01/30/23 1821    Lab Status:  In process Specimen: Blood from Arm, Left Updated: 01/30/23 1828    Blood culture #1 [[id number]] Collected: 01/30/23 1821    Lab Status: In process Specimen: Blood from Arm, Left Updated: 01/30/23 1824    UA w Reflex to Microscopic w Reflex to Culture [[id number]]     Lab Status: No result Specimen: Urine, Clean Catch                  XR chest portable    (Results Pending)              Procedures  Procedures         ED Course  ED Course as of 01/30/23 1911 Mon Jan 30, 2023 1859 LACTIC ACID: 1 4 1859 BUN: 13 1859 Creatinine: 0 88                               SBIRT 20yo+    Flowsheet Row Most Recent Value   SBIRT (23 yo +)    In order to provide better care to our patients, we are screening all of our patients for alcohol and drug use  Would it be okay to ask you these screening questions? Yes Filed at: 01/30/2023 1749   Initial Alcohol Screen: US AUDIT-C     1  How often do you have a drink containing alcohol? 0 Filed at: 01/30/2023 1749   2  How many drinks containing alcohol do you have on a typical day you are drinking? 0 Filed at: 01/30/2023 1749   3a  Male UNDER 65: How often do you have five or more drinks on one occasion? 0 Filed at: 01/30/2023 1749   3b  FEMALE Any Age, or MALE 65+: How often do you have 4 or more drinks on one occassion? 0 Filed at: 01/30/2023 1749   Audit-C Score 0 Filed at: 01/30/2023 1749   [place]: How many times in the past year have you    Used an illegal drug or used a prescription medication for non-medical reasons? Never Filed at: 01/30/2023 1749                    Medical Decision Making  Sepsis panel ordered IMMEDIATELY upon seeing the patient  + fever with tachycardia  Patient had + COVID at urgent care today - so test NOT repeated  Patient has presented to the Emergency Department withnew illness-injury that poses a threat to life or body function  Severe anemia needing blood transfusion     At least 3 different tests have been ordered on this patient and reviewed the results     Anemia (6)    Old laboratory data was reviewed from the medical records and compared to today's results  Discussion with patient and or family members of results (normal and abnormal) and the implications for immediate and long term treatment/management  Hospitalization was considered in this patient   discussed with hospitalist agree to admit         Anemia: self-limited or minor problem  COVID: acute illness or injury  Pneumonia: acute illness or injury  Amount and/or Complexity of Data Reviewed  Labs: ordered  Decision-making details documented in ED Course  Details: see above  Radiology: ordered and independent interpretation performed  Details: Independently visualized by me, RUL PNA      Risk  OTC drugs  Prescription drug management  Decision regarding hospitalization  Disposition  Final diagnoses:   Pneumonia   COVID   Anemia     Time reflects when diagnosis was documented in both MDM as applicable and the Disposition within this note     Time User Action Codes Description Comment    1/30/2023  7:10 PM Yang Denney [J18 9] Pneumonia     1/30/2023  7:10 PM Yang Denney [U07 1] COVID     1/30/2023  7:10 PM MaddieYang Hammond [D64 9] Anemia       ED Disposition     ED Disposition   Admit    Condition   Stable    Date/Time   Mon Jan 30, 2023  7:10 PM    Comment   Case was discussed with Hospitalist and the patient's admission status was agreed to be Admission Status: observation status to the service of Dr Gino Orellana     Follow-up Information    None         Patient's Medications   Discharge Prescriptions    No medications on file       No discharge procedures on file      PDMP Review     None          ED Provider  Electronically Signed by           Maciej Hurst MD  01/30/23 9195

## 2023-01-31 ENCOUNTER — APPOINTMENT (OUTPATIENT)
Dept: RADIOLOGY | Facility: HOSPITAL | Age: 68
End: 2023-01-31

## 2023-01-31 PROBLEM — U07.1 COVID-19: Status: ACTIVE | Noted: 2023-01-31

## 2023-01-31 PROBLEM — G92.9 TOXIC ENCEPHALOPATHY: Status: ACTIVE | Noted: 2023-01-31

## 2023-01-31 LAB
ABO GROUP BLD BPU: NORMAL
ANION GAP SERPL CALCULATED.3IONS-SCNC: 12 MMOL/L (ref 4–13)
ATRIAL RATE: 112 BPM
BACTERIA UR QL AUTO: NORMAL /HPF
BILIRUB UR QL STRIP: NEGATIVE
BPU ID: NORMAL
BUN SERPL-MCNC: 7 MG/DL (ref 5–25)
CALCIUM SERPL-MCNC: 7.4 MG/DL (ref 8.3–10.1)
CHLORIDE SERPL-SCNC: 104 MMOL/L (ref 96–108)
CLARITY UR: CLEAR
CO2 SERPL-SCNC: 21 MMOL/L (ref 21–32)
COLOR UR: ABNORMAL
CREAT SERPL-MCNC: 0.67 MG/DL (ref 0.6–1.3)
CROSSMATCH: NORMAL
GFR SERPL CREATININE-BSD FRML MDRD: 90 ML/MIN/1.73SQ M
GLUCOSE P FAST SERPL-MCNC: 165 MG/DL (ref 65–99)
GLUCOSE SERPL-MCNC: 165 MG/DL (ref 65–140)
GLUCOSE UR STRIP-MCNC: ABNORMAL MG/DL
HCT VFR BLD AUTO: 24.3 % (ref 34.8–46.1)
HGB BLD-MCNC: 8.6 G/DL (ref 11.5–15.4)
HGB UR QL STRIP.AUTO: ABNORMAL
KETONES UR STRIP-MCNC: ABNORMAL MG/DL
LEUKOCYTE ESTERASE UR QL STRIP: NEGATIVE
MAGNESIUM SERPL-MCNC: 1.4 MG/DL (ref 1.6–2.6)
MCH RBC QN AUTO: 39.1 PG (ref 26.8–34.3)
MCHC RBC AUTO-ENTMCNC: 35.4 G/DL (ref 31.4–37.4)
MCV RBC AUTO: 111 FL (ref 82–98)
NITRITE UR QL STRIP: NEGATIVE
NON-SQ EPI CELLS URNS QL MICRO: NORMAL /HPF
P AXIS: 67 DEGREES
PH UR STRIP.AUTO: 6 [PH]
PLATELET # BLD AUTO: 94 THOUSANDS/UL (ref 149–390)
PMV BLD AUTO: 12.3 FL (ref 8.9–12.7)
POTASSIUM SERPL-SCNC: 4 MMOL/L (ref 3.5–5.3)
PR INTERVAL: 132 MS
PROT UR STRIP-MCNC: ABNORMAL MG/DL
QRS AXIS: 23 DEGREES
QRSD INTERVAL: 74 MS
QT INTERVAL: 322 MS
QTC INTERVAL: 439 MS
RBC # BLD AUTO: 2.2 MILLION/UL (ref 3.81–5.12)
RBC #/AREA URNS AUTO: NORMAL /HPF
SODIUM SERPL-SCNC: 137 MMOL/L (ref 135–147)
SP GR UR STRIP.AUTO: 1.02 (ref 1–1.03)
T WAVE AXIS: 48 DEGREES
UNIT DISPENSE STATUS: NORMAL
UNIT PRODUCT CODE: NORMAL
UNIT PRODUCT VOLUME: 300 ML
UNIT RH: NORMAL
UROBILINOGEN UR STRIP-ACNC: 2 MG/DL
VENTRICULAR RATE: 112 BPM
WBC # BLD AUTO: 3.27 THOUSAND/UL (ref 4.31–10.16)
WBC #/AREA URNS AUTO: NORMAL /HPF

## 2023-01-31 PROCEDURE — 30233N1 TRANSFUSION OF NONAUTOLOGOUS RED BLOOD CELLS INTO PERIPHERAL VEIN, PERCUTANEOUS APPROACH: ICD-10-PCS | Performed by: INTERNAL MEDICINE

## 2023-01-31 RX ADMIN — ACETAMINOPHEN 650 MG: 325 TABLET, FILM COATED ORAL at 15:06

## 2023-01-31 RX ADMIN — SODIUM CHLORIDE 3 ML: 9 INJECTION INTRAMUSCULAR; INTRAVENOUS; SUBCUTANEOUS at 05:25

## 2023-01-31 RX ADMIN — SODIUM CHLORIDE 3 ML: 9 INJECTION INTRAMUSCULAR; INTRAVENOUS; SUBCUTANEOUS at 21:48

## 2023-01-31 RX ADMIN — LEVETIRACETAM 500 MG: 500 TABLET, FILM COATED ORAL at 08:09

## 2023-01-31 RX ADMIN — CEFTRIAXONE SODIUM 1000 MG: 10 INJECTION, POWDER, FOR SOLUTION INTRAVENOUS at 21:48

## 2023-01-31 RX ADMIN — DOCUSATE SODIUM 100 MG: 100 CAPSULE, LIQUID FILLED ORAL at 08:09

## 2023-01-31 RX ADMIN — SODIUM CHLORIDE 3 ML: 9 INJECTION INTRAMUSCULAR; INTRAVENOUS; SUBCUTANEOUS at 13:03

## 2023-01-31 RX ADMIN — LEVETIRACETAM 500 MG: 500 TABLET, FILM COATED ORAL at 21:48

## 2023-01-31 RX ADMIN — STANDARDIZED SENNA CONCENTRATE 8.6 MG: 8.6 TABLET ORAL at 08:09

## 2023-01-31 RX ADMIN — REMDESIVIR 100 MG: 100 INJECTION, POWDER, LYOPHILIZED, FOR SOLUTION INTRAVENOUS at 22:58

## 2023-01-31 RX ADMIN — REMDESIVIR 200 MG: 100 INJECTION, POWDER, LYOPHILIZED, FOR SOLUTION INTRAVENOUS at 01:47

## 2023-01-31 NOTE — ASSESSMENT & PLAN NOTE
· Present on admission- tachycardia, fevers, leukopenia, tachypnea   · Likely viral sepsis with superimposed bacterial pneumonia given procal+  · Continue ceftriaxone and remdesivir  · CT abdomen pelvis-Consolidative and groundglass opacity in the lateral right upper lobe consistent with a dense lateral right upper lobe pneumonia  Appearance of gastric wall thickening which could be related to underdistention or possibly gastritis     · Blood cultures pending  · UA negative

## 2023-01-31 NOTE — ASSESSMENT & PLAN NOTE
· Present on admission  · Leukopenia, anemia, thrombocytopenia  · Could be reactive in setting of sepsis  · Given 1 unit PRBC in ED- hemoglobin has remained stable since transfusion  · CT chest, abdomen, pelvis Appearance of gastric wall thickening which could be related to underdistention or possibly gastritis Consolidative and groundglass opacity in the lateral right upper lobe consistent with a dense lateral right upper lobe pneumonia   · Patient reports some possible hematuria today, UA ordered with reflex to culture

## 2023-01-31 NOTE — OCCUPATIONAL THERAPY NOTE
Occupational Therapy Evaluation        Patient Name: Rukhsana Ocampo  [id number] Date: 1/31/2023 01/31/23 0916   OT Last Visit   OT Visit Date 01/31/23   Note Type   Note type Evaluation   Pain Assessment   Pain Assessment Tool 0-10   Pain Score No Pain   Restrictions/Precautions   Weight Bearing Precautions Per Order No   Other Precautions Cognitive; Chair Alarm; Bed Alarm;Multiple lines;Telemetry; Fall Risk;Contact/isolation; Airborne/isolation;Droplet precautions  (Covid)   Home Living   Type of [address] [place] [address] Two level;Bed/bath upstairs  (2-3 DG; flight of stairs to 2nd floor)   Bathroom Shower/Tub Walk-in shower   Bathroom Toilet Standard   [id number]   Additional Comments Pt ambulates with a walker  Prior Function   Level of [place] Independent with ADLs; Independent with functional mobility; Needs assistance with IADLS   Lives With Daughter;Family   Receives Help From Family   IADLs Family/Friend/Other provides transportation; Independent with meal prep; Independent with medication management   Falls in the last 6 months 0   Vocational Retired   Lifestyle   Autonomy Patient was noted with inconsistent responses t/o session, (+) cognitive deficits  Patient reported she was independent with ADLs/ IADLs and functional mobility  Patient lives with her daughter and family in a 2 story house, 2-3 DG; flight of stairs to 2nd floor     Reciprocal Relationships Supportive family   General   Family/Caregiver Present No   ADL   Eating Assistance 6  Modified independent   Grooming Assistance 5  Supervision/Setup   UB Bathing Assistance 4  Minimal Assistance   LB Bathing Assistance 2  Maximal Assistance   UB Dressing Assistance 4  Minimal Assistance   LB Dressing Assistance 2  Maximal [address]  2  Maximal Assistance   Functional Assistance 3  Moderate Assistance   Additional Comments impulsive, decreased safety awareness   Bed Mobility   Sit to Supine 4  Minimal assistance   Additional items Assist x 1; Increased time required;Verbal cues;LE management   Transfers   Sit to Stand 4  Minimal assistance  (close contact guard)   Additional items Assist x 1; Increased time required;Verbal cues   Stand to Sit 4  Minimal assistance  (close contact guard)   Additional items Assist x 1; Increased time required;Verbal cues   Balance   Static Sitting Fair +   Dynamic Sitting Fair   Static Standing Fair -   Dynamic Standing Poor +   Activity Tolerance   Activity Tolerance Patient limited by fatigue   Medical Staff Made Aware   (Co-evaluation performed with PT secondary to complex medical condition of patient and regression of functional status from baseline  PT/OT goals were addressed separately )   RUE Assessment   RUE Assessment X  (limited overhead movements/ strength deficit)   RUE Strength   RUE Overall Strength Deficits  (3+/5)   LUE Assessment   LUE Assessment X  (limited overhead movements/ strength deficit)   LUE Strength   LUE Overall Strength Deficits  (3+/5)   Vision-Basic Assessment   Current Vision Does not wear glasses   Cognition   Overall Cognitive Status Impaired   Arousal/Participation Alert; Responsive; Cooperative   Attention Within functional limits   Orientation Level Oriented to person;Oriented to situation  (inconsistent to time and place)   Memory Decreased recall of recent events;Decreased short term memory   Following Commands Follows one step commands without difficulty   Assessment   Limitation Decreased ADL status; Decreased UE strength;Decreased Safe judgement during ADL;Decreased endurance;Decreased cognition;Decreased self-care trans;Decreased high-level ADLs   Prognosis Good   Assessment Patient is a 75 y o  female seen for OT evaluation s/p admit to [id number] [facility] on 1/30/2023 w/Sepsis [facility])   Commorbidities affecting patient's functional performance at time of assessment include: type 2 DM, seizure like activity, and pancytopenia  Orders placed for OT evaluation and treatment  Performed at least two patient identifiers during session including name and wristband  Prior to admission, Patient was noted with inconsistent responses t/o session, (+) cognitive deficits  Patient reported she was independent with ADLs/ IADLs and functional mobility  Patient lives with her daughter and family in a 2 story house, 2-3 DG; flight of stairs to 2nd floor  Personal factors affecting patient at time of initial evaluation include: limited insight into deficits, non compliance, decreased initiation and engagement, difficulty performing ADLs and difficulty performing IADLs  Upon evaluation, patient requires minimal  assist for UB ADLs, moderate assist for LB ADLs  Patient is alert, oriented to name,, oriented to place,, disoriented to time, and disoriented to situation,, presents with limited ability to focus on a task over time (attention span), limited ability to initiate an activity or stop an activity at an appropriate time, limited ability to recall information after a brief period of time (short term memory) / working memory  and limited ability to place information, concepts and actions in order (sequencing)  Occupational performance is affected by the following deficits: orientation, attention span, impulsive behavior, decreased functional use of BUEs, degenerative arthritic joint changes, dynamic sit/ stand balance deficit with poor standing tolerance time for self care and functional mobility, decreased activity tolerance, impaired problem solving and decreased safety awareness  Patient to benefit from continued Occupational Therapy treatment while in the hospital to address deficits as defined above and maximize level of functional independence with ADLs and functional mobility   Occupational Performance areas to address include: bathing/ shower, dressing, toilet hygiene, transfer to all surfaces, functional ambulation, functional mobility, community mobility, health maintenance, medication routine/ management and IADLs: safety procedures  From OT standpoint, recommendation at time of d/c would be Home with family support, [facility], vs STR pending progress  Plan   Treatment Interventions ADL retraining;Functional transfer training;UE strengthening/ROM; Endurance training;Cognitive reorientation;Patient/family training;Equipment evaluation/education; Compensatory technique education;Continued evaluation; Energy conservation; Activityengagement   Goal Expiration Date 02/14/23   OT Frequency 3-5x/wk   Recommendation   OT Discharge Recommendation Home with home health rehabilitation  (home with HHOT vs STR?)   AM-PAC Daily Activity Inpatient   Lower Body Dressing 2   Bathing 2   Toileting 3   Upper Body Dressing 3   Grooming 3   Eating 4   Daily Activity Raw Score 17   Daily Activity Standardized Score (Calc for Raw Score >=11) 37 26   AM-PAC Applied Cognition Inpatient   Following a Speech/Presentation 3   Understanding Ordinary Conversation 3   Taking Medications 2   Remembering Where Things Are Placed or Put Away 2   Remembering List of 4-5 Errands 1   Taking Care of Complicated Tasks 1   Applied Cognition Raw Score 12   Applied Cognition Standardized Score 28 82   Barthel Index   Feeding 10   Bathing 0   Grooming Score 0   Dressing Score 5   Bladder Score 5   Bowels Score 10   Toilet Use Score 5   Transfers (Bed/Chair) Score 10   Mobility (Level Surface) Score 0   Stairs Score 0   Barthel Index Score 45           1 - Patient will verbalize and demonstrate use of energy conservation/ deep breathing technique and work simplification skills during functional activity with no verbal cues  2 - Patient will verbalize and demonstrate good body mechanics and joint protection techniques during  ADLs/ IADLs with no verbal cues      3 - Patient will increase OOB/ sitting tolerance to 2-4 hours per day for increased participation in self care and leisure tasks with no s/s of exertion  4 - Patient will increase standing tolerance time to 5  minutes with unilateral UE support to complete sink level ADLs@ mod I level  5 - Patient will increase sitting tolerance at edge of bed to 20 minutes to complete UB ADLs @ set up assist level  6 - Patient will transfer bed to Chair / toilet at Set up assist level with AD as indicated  7 - Patient will complete UB ADLs with set up assist      8 - Patient will complete LB ADLs with min assist with the use of adaptive equipment  9 - Patient will complete toileting hygiene with set up assist/ supervision for thoroughness    10 - Patient/ Family  will demonstrate competency with UE Home Exercise Program        11- Patient will engage in further cognitive assessment to ascertain cognitive functioning

## 2023-01-31 NOTE — ASSESSMENT & PLAN NOTE
· Present on admission  · Leukopenia, anemia, thrombocytopenia  · Could be reactive in setting of sepsis  · Given 1 unit PRBC in ED  · No active signs of bleeding  · Order CT chest, abdomen, pelvis for further evaluation

## 2023-01-31 NOTE — ED NOTES
Per departing shift, patient has difficult veins and after many unsuccessful attempts including with the use of the ultra sound, an additional line was not obtained  Patient currently has [place] due provider notified of delay due patients unit of P-RBC running  Patient is resting comfortably        Antonino Wyman RN  01/30/23 1257

## 2023-01-31 NOTE — PHYSICAL THERAPY NOTE
Physical Therapy Evaluation     Patient's Name: Sujit Sepulveda    Admitting Diagnosis  Altered mental status [R41 82]    Problem List  Patient Active Problem List   Diagnosis    DM type 2 (diabetes mellitus, type 2) ([facility] )    Hyperlipidemia associated with type 2 diabetes mellitus ([facility] )    Seizure-like activity ([facility] )    Sepsis ([facility] )    Pancytopenia ([facility] )     Past Medical History  Past Medical History:   Diagnosis Date    Diabetes mellitus ([facility] )      Past Surgical History  History reviewed  No pertinent surgical history  01/31/23 0856   PT Last Visit   PT Visit Date 01/31/23   Note Type   Note type Evaluation   Pain Assessment   Pain Assessment Tool 0-10   Pain Score No Pain   Restrictions/Precautions   Weight Bearing Precautions Per Order No   Other Precautions Cognitive; Chair Alarm; Bed Alarm;Multiple lines;Telemetry; Fall Risk;Contact/isolation; Airborne/isolation;Droplet precautions  (+COVID)   Home Living   Type of [address] [place] [address] Two level;Bed/bath upstairs  (2-3 DG; flight of stairs to 2nd floor)   Bathroom Shower/Tub Walk-in shower   Bathroom Toilet Standard   [id number]   Additional Comments Pt ambulates with a walker  Prior Function   Level of [place] Independent with ADLs; Independent with functional mobility; Needs assistance with IADLS   Lives With Daughter;Family   Receives Help From Family   IADLs Family/Friend/Other provides transportation; Independent with meal prep; Independent with medication management   Falls in the last 6 months 0   Vocational Retired   General   Family/Caregiver Present No   Cognition   Overall Cognitive Status Impaired   Arousal/Participation Alert   Orientation Level Oriented to person;Oriented to situation  (inconsistent to time and place)   Memory Decreased recall of recent events;Decreased short term memory   Following Commands Follows one step commands without difficulty Comments Pt agreeable to PT  Subjective   Subjective "I'm ok "   RUE Assessment   RUE Assessment   (defer to OT assessment)   LUE Assessment   LUE Assessment   (defer to OT assessment)   RLE Assessment   RLE Assessment X   Strength RLE   RLE Overall Strength 4-/5   LLE Assessment   LLE Assessment X   Strength LLE   LLE Overall Strength 4-/5   Light Touch   RLE Light Touch Grossly intact   LLE Light Touch Grossly intact   Bed Mobility   Sit to Supine 4  Minimal assistance   Additional items Assist x 1; Increased time required;Verbal cues;LE management   Transfers   Sit to Stand 4  Minimal assistance  (CG assist)   Additional items Assist x 1; Increased time required;Verbal cues   Stand to Sit 4  Minimal assistance  (CG assist)   Additional items Assist x 1; Increased time required;Verbal cues   Ambulation/Elevation   Gait pattern Decreased toe off;Decreased heel strike;Decreased hip extension; Excessively slow; Step to;Short stride; Shuffling   Gait Assistance 4  Minimal assist   Additional items Assist x 1;Verbal cues   Assistive Device None   Distance 20 feet   Balance   Static Sitting Fair +   Dynamic Sitting Fair   Static Standing Fair -   Dynamic Standing Poor +   Ambulatory Poor +   Endurance Deficit   Endurance Deficit Yes   Endurance Deficit Description decreased activity tolerance   Activity Tolerance   Activity Tolerance Patient limited by fatigue   Medical Staff Made Aware OT Elda Miranda  (Co-evaluation performed with OT secondary to complex medical condition of patient and regression of functional status from baseline  PT/OT goals were addressed separately )   Nurse Made Aware RN Beto [place]   Assessment   Prognosis Good   Problem List Decreased strength;Decreased endurance; Impaired balance;Decreased mobility; Decreased cognition   Assessment Pt is 75year old female seen for PT evaluation s/p admit to [facility] on 1/30/2023 with Sepsis ([address] )  PT consulted to assess pt's functional mobility and d/c needs   Order placed for PT eval and tx  Comorbidities affecting pt's physical performance at time of assessment include type 2 DM, seizure like activity, and pancytopenia  PTA, pt was independent with all functional mobility without an AD  Pt ambulates household and community distances  Pt resides with her daughter and family in a two level house with 2-3 steps to enter and a flight of stairs to the 2nd floor  Personal factors affecting pt at time of IE include lives in a two story house, stairs to enter home, inability to navigate community distances, inability to navigate level surfaces without external assistance, unable to perform dynamic tasks in community, decreased cognition, inability to perform IADLs, and difficulty performing ADLs  Please find objective findings from PT assessment regarding body systems outlined above with impairments and limitations including weakness, impaired balance, decreased endurance, gait deviations, decreased activity tolerance, decreased functional mobility tolerance, fall risk, and decreased cognition  The following objective measures performed on IE also reveal limitations: Barthel Index: 45/100, Modified Kriss: 4 (moderate/severe disability) and AM-PAC 6-Clicks: 89/63  Pt's clinical presentation is currently unstable/unpredictable seen in pt's presentation of need for ongoing medical management/monitoring, pt is a fall risk, and pt requires cues and assist for safety with functional mobility  Pt to benefit from continued PT tx to address deficits as defined above and maximize level of functional independent mobility and consistency  From PT/mobility standpoint, recommendation at time of d/c would be Home PT vs  STR pending progress in order to facilitate return to PLOF     Barriers to Discharge Inaccessible home environment;Decreased caregiver support   Goals   STG Expiration Date 02/10/23   Short Term Goal #1 In 10 days: Increase bilateral LE strength 1/2 grade to facilitate independent mobility, Perform all bed mobility tasks modified independent to decrease caregiver burden, Perform all transfers modified independent to improve independence, Ambulate > 150 ft  with least restrictive assistive device modified independent w/o LOB and w/ normalized gait pattern 100% of the time, Navigate 12 stairs modified independent with unilateral handrail to facilitate return to previous living environment and Increase all balance 1/2 grade to decrease risk for falls   Plan   Treatment/Interventions Functional transfer training;LE strengthening/ROM; Elevations; Therapeutic exercise; Endurance training;Cognitive reorientation;Patient/family training;Bed mobility;Gait training;Spoke to nursing;OT   PT Frequency 3-5x/wk   Recommendation   PT Discharge Recommendation Home with home health rehabilitation  (vs STR pending progress)   AM-PAC Basic Mobility Inpatient   Turning in Flat Bed Without Bedrails 3   Lying on Back to Sitting on Edge of Flat Bed Without Bedrails 3   Moving Bed to Chair 3   Standing Up From Chair Using Arms 3   Walk in Room 3   Climb 3-5 Stairs With Railing 2   Basic Mobility Inpatient Raw Score 17   Basic Mobility Standardized Score 39 67   Highest Level Of Mobility   -[facility] Goal 5: Stand one or more mins   -HL Achieved 6: Walk 10 steps or more   Modified Pierce Scale   Modified Kriss Scale 4   Barthel Index   Feeding 10   Bathing 0   Grooming Score 0   Dressing Score 5   Bladder Score 5   Bowels Score 10   Toilet Use Score 5   Transfers (Bed/Chair) Score 10   Mobility (Level Surface) Score 0   Stairs Score 0   Barthel Index Score 45     PT Evaluation Time: 4438-4121  Kimani Falcon, PT, DPT

## 2023-01-31 NOTE — PLAN OF CARE
Problem: OCCUPATIONAL THERAPY ADULT  Goal: Performs self-care activities at highest level of function for planned discharge setting  See evaluation for individualized goals  Description: Treatment Interventions: ADL retraining, Functional transfer training, UE strengthening/ROM, Endurance training, Cognitive reorientation, Patient/family training, Equipment evaluation/education, Compensatory technique education, Continued evaluation, Energy conservation, Activityengagement          See flowsheet documentation for full assessment, interventions and recommendations  Note: Limitation: Decreased ADL status, Decreased UE strength, Decreased Safe judgement during ADL, Decreased endurance, Decreased cognition, Decreased self-care trans, Decreased high-level ADLs  Prognosis: Good  Assessment: Patient is a 75 y o  female seen for OT evaluation s/p admit to [address] [place] [address] on 1/30/2023 w/Sepsis [facility])  Commorbidities affecting patient's functional performance at time of assessment include: type 2 DM, seizure like activity, and pancytopenia  Orders placed for OT evaluation and treatment  Performed at least two patient identifiers during session including name and wristband  Prior to admission, Patient was noted with inconsistent responses t/o session, (+) cognitive deficits  Patient reported she was independent with ADLs/ IADLs and functional mobility  Patient lives with her daughter and family in a 2 story house, 2-3 DG; flight of stairs to 2nd floor  Personal factors affecting patient at time of initial evaluation include: limited insight into deficits, non compliance, decreased initiation and engagement, difficulty performing ADLs and difficulty performing IADLs  Upon evaluation, patient requires minimal  assist for UB ADLs, moderate assist for LB ADLs    Patient is alert, oriented to name,, oriented to place,, disoriented to time, and disoriented to situation,, presents with limited ability to focus on a task over time (attention span), limited ability to initiate an activity or stop an activity at an appropriate time, limited ability to recall information after a brief period of time (short term memory) / working memory  and limited ability to place information, concepts and actions in order (sequencing)  Occupational performance is affected by the following deficits: orientation, attention span, impulsive behavior, decreased functional use of BUEs, degenerative arthritic joint changes, dynamic sit/ stand balance deficit with poor standing tolerance time for self care and functional mobility, decreased activity tolerance, impaired problem solving and decreased safety awareness  Patient to benefit from continued Occupational Therapy treatment while in the hospital to address deficits as defined above and maximize level of functional independence with ADLs and functional mobility  Occupational Performance areas to address include: bathing/ shower, dressing, toilet hygiene, transfer to all surfaces, functional ambulation, functional mobility, community mobility, health maintenance, medication routine/ management and IADLs: safety procedures  From OT standpoint, recommendation at time of d/c would be Home with family support, [address] [place] [address] and Home OT, vs STR pending progress       OT Discharge Recommendation: Home with home health rehabilitation (home with HHOT vs STR?)

## 2023-01-31 NOTE — PLAN OF CARE
Problem: PAIN - ADULT  Goal: Verbalizes/displays adequate comfort level or baseline comfort level  Description: Interventions:  - Encourage patient to monitor pain and request assistance  - Assess pain using appropriate pain scale  - Administer analgesics based on type and severity of pain and evaluate response  - Implement non-pharmacological measures as appropriate and evaluate response  - Consider cultural and social influences on pain and pain management  - Notify physician/advanced practitioner if interventions unsuccessful or patient reports new pain  Outcome: Progressing     Problem: INFECTION - ADULT  Goal: Absence or prevention of progression during hospitalization  Description: INTERVENTIONS:  - Assess and monitor for signs and symptoms of infection  - Monitor lab/diagnostic results  - Monitor all insertion sites, i e  indwelling lines, tubes, and drains  - Monitor endotracheal if appropriate and nasal secretions for changes in amount and color  - [place] appropriate cooling/warming therapies per order  - Administer medications as ordered  - Instruct and encourage patient and family to use good hand hygiene technique  - Identify and instruct in appropriate isolation precautions for identified infection/condition  Outcome: Progressing  Goal: Absence of fever/infection during neutropenic period  Description: INTERVENTIONS:  - Monitor WBC    Outcome: Progressing     Problem: SAFETY ADULT  Goal: Patient will remain free of falls  Description: INTERVENTIONS:  - Educate patient/family on patient safety including physical limitations  - Instruct patient to call for assistance with activity   - Consult OT/PT to assist with strengthening/mobility   - Keep Call bell within reach  - Keep bed low and locked with side rails adjusted as appropriate  - Keep care items and personal belongings within reach  - Initiate and maintain comfort rounds  - Make Fall Risk Sign visible to staff  - Offer Toileting every  Hours, in advance of need  - Initiate/Maintain alarm  - Obtain necessary fall risk management equipment:   - Apply yellow socks and bracelet for high fall risk patients  - Consider moving patient to room near nurses station  Outcome: Progressing  Goal: Maintain or return to baseline ADL function  Description: INTERVENTIONS:  -  Assess patient's ability to carry out ADLs; assess patient's baseline for ADL function and identify physical deficits which impact ability to perform ADLs (bathing, care of mouth/teeth, toileting, grooming, dressing, etc )  - Assess/evaluate cause of self-care deficits   - Assess range of motion  - Assess patient's mobility; develop plan if impaired  - Assess patient's need for assistive devices and provide as appropriate  - Encourage maximum independence but intervene and supervise when necessary  - Involve family in performance of ADLs  - Assess for home care needs following discharge   - Consider OT consult to assist with ADL evaluation and planning for discharge  - Provide patient education as appropriate  Outcome: Progressing  Goal: Maintains/Returns to pre admission functional level  Description: INTERVENTIONS:  - Perform BMAT or MOVE assessment daily    - Set and communicate daily mobility goal to care team and patient/family/caregiver  - Collaborate with rehabilitation services on mobility goals if consulted  - Perform Range of Motion  times a day  - Reposition patient every  hours    - Dangle patient  times a day  - Stand patient  times a day  - Ambulate patient  times a day  - Out of bed to chair  times a day   - Out of bed for meals  times a day  - Out of bed for toileting  - Record patient progress and toleration of activity level   Outcome: Progressing     Problem: DISCHARGE PLANNING  Goal: Discharge to home or other facility with appropriate resources  Description: INTERVENTIONS:  - Identify barriers to discharge w/patient and caregiver  - Arrange for needed discharge resources and transportation as appropriate  - Identify discharge learning needs (meds, wound care, etc )  - Arrange for interpretive services to assist at discharge as needed  - Refer to Case Management Department for coordinating discharge planning if the patient needs post-hospital services based on physician/advanced practitioner order or complex needs related to functional status, cognitive ability, or social support system  Outcome: Progressing     Problem: Knowledge Deficit  Goal: Patient/family/caregiver demonstrates understanding of disease process, treatment plan, medications, and discharge instructions  Description: Complete learning assessment and assess knowledge base    Interventions:  - Provide teaching at level of understanding  - Provide teaching via preferred learning methods  Outcome: Progressing     Problem: Potential for Falls  Goal: Patient will remain free of falls  Description: INTERVENTIONS:  - Educate patient/family on patient safety including physical limitations  - Instruct patient to call for assistance with activity   - Consult OT/PT to assist with strengthening/mobility   - Keep Call bell within reach  - Keep bed low and locked with side rails adjusted as appropriate  - Keep care items and personal belongings within reach  - Initiate and maintain comfort rounds  - Make Fall Risk Sign visible to staff  - Offer Toileting every  Hours, in advance of need  - Initiate/Maintain alarm  - Obtain necessary fall risk management equipment:   - Apply yellow socks and bracelet for high fall risk patients  - Consider moving patient to room near nurses station  Outcome: Progressing     Problem: Prexisting or High Potential for Compromised Skin Integrity  Goal: Skin integrity is maintained or improved  Description: INTERVENTIONS:  - Identify patients at risk for skin breakdown  - Assess and monitor skin integrity  - Assess and monitor nutrition and hydration status  - Monitor labs   - Assess for incontinence   - Turn and reposition patient  - Assist with mobility/ambulation  - Relieve pressure over bony prominences  - Avoid friction and shearing  - Provide appropriate hygiene as needed including keeping skin clean and dry  - Evaluate need for skin moisturizer/barrier cream  - Collaborate with interdisciplinary team   - Patient/family teaching  - Consider wound care consult   Outcome: Progressing

## 2023-01-31 NOTE — PLAN OF CARE
Problem: PHYSICAL THERAPY ADULT  Goal: Performs mobility at highest level of function for planned discharge setting  See evaluation for individualized goals  Description: Treatment/Interventions: Functional transfer training, LE strengthening/ROM, Elevations, Therapeutic exercise, Endurance training, Cognitive reorientation, Patient/family training, Bed mobility, Gait training, Spoke to nursing, OT          See flowsheet documentation for full assessment, interventions and recommendations  Note: Prognosis: Good  Problem List: Decreased strength, Decreased endurance, Impaired balance, Decreased mobility, Decreased cognition  Assessment: Pt is 75year old female seen for PT evaluation s/p admit to [facility] on 1/30/2023 with Sepsis ([facility] )  PT consulted to assess pt's functional mobility and d/c needs  Order placed for PT eval and tx  Comorbidities affecting pt's physical performance at time of assessment include type 2 DM, seizure like activity, and pancytopenia  PTA, pt was independent with all functional mobility without an AD  Pt ambulates household and community distances  Pt resides with her daughter and family in a two level house with 2-3 steps to enter and a flight of stairs to the 2nd floor  Personal factors affecting pt at time of IE include lives in a two story house, stairs to enter home, inability to navigate community distances, inability to navigate level surfaces without external assistance, unable to perform dynamic tasks in community, decreased cognition, inability to perform IADLs, and difficulty performing ADLs  Please find objective findings from PT assessment regarding body systems outlined above with impairments and limitations including weakness, impaired balance, decreased endurance, gait deviations, decreased activity tolerance, decreased functional mobility tolerance, fall risk, and decreased cognition   The following objective measures performed on IE also reveal limitations: Barthel Index: 45/100, Modified [place]: 4 (moderate/severe disability) and AM-PAC 6-Clicks: 30/27  Pt's clinical presentation is currently unstable/unpredictable seen in pt's presentation of need for ongoing medical management/monitoring, pt is a fall risk, and pt requires cues and assist for safety with functional mobility  Pt to benefit from continued PT tx to address deficits as defined above and maximize level of functional independent mobility and consistency  From PT/mobility standpoint, recommendation at time of d/c would be Home PT vs  STR pending progress in order to facilitate return to PLOF  Barriers to Discharge: Inaccessible home environment, Decreased caregiver support     PT Discharge Recommendation: Home with home health rehabilitation (vs STR pending progress)    See flowsheet documentation for full assessment

## 2023-01-31 NOTE — H&P
[address] [facility]  H&P- Rima Abdi 1955, 75 y o  female MRN: [id number]  Unit/Bed#: ED 06 Encounter: [id number]  Primary Care Provider: Celeste Mary MD   Date and time admitted to hospital: 1/30/2023  5:14 PM    * Sepsis [facility])  Assessment & Plan  · Present on admission- tachycardia, fevers, leukopenia, tachypnea   · Likely viral sepsis with superimposed bacterial pneumonia given procal+  · Given dose of IV levaquin in ED   · Adjust to ceftriaxone, Add remdesivir  · Order CT imaging for further evaluation  · Follow up infectious work up     Pancytopenia [facility])  Assessment & Plan  · Present on admission  · Leukopenia, anemia, thrombocytopenia  · Could be reactive in setting of sepsis  · Given 1 unit PRBC in ED  · No active signs of bleeding  · Order CT chest, abdomen, pelvis for further evaluation    Seizure-like activity ([facility] )  Assessment & Plan  · Continue home dose keppra    DM type 2 (diabetes mellitus, type 2) ([facility] )  Assessment & Plan  Lab Results   Component Value Date    HGBA1C 7 4 (H) 09/27/2022       No results for input(s): POCGLU in the last 72 hours  Blood Sugar Average: Last 72 hrs:  · Adequately controlled  · Start sliding scale for now  · Monitor glucose levels     VTE Pharmacologic Prophylaxis: VTE Score: 3 Moderate Risk (Score 3-4) - Pharmacological DVT Prophylaxis Contraindicated  Sequential Compression Devices Ordered  Code Status: Level 1 - Full Code     Anticipated Length of Stay: Patient will be admitted on an observation basis with an anticipated length of stay of less than 2 midnights secondary to sepsis  Total Time for Visit, including Counseling / Coordination of Care: 30 minutes Greater than 50% of this total time spent on direct patient counseling and coordination of care      Chief Complaint: constellation of symptoms    History of Present Illness:  Rima Abdi is a 75 y o  female with a PMH of diabetes, seizure disorder who presents with constellation of symptoms including weakness, diarrhea, poor PO intake  Went to urgent care and was confirmed + for Covid 19  She was sent to the ED for further evaluation  In the ED, she was noted to meet sepsis criteria and admitted to medicine for further management  Review of Systems:  Review of Systems   Constitutional: Positive for activity change, appetite change and fatigue  Negative for chills and fever  HENT: Negative for ear pain and sore throat  Eyes: Negative for pain and visual disturbance  Respiratory: Negative for cough and shortness of breath  Cardiovascular: Negative for chest pain and palpitations  Gastrointestinal: Positive for diarrhea  Negative for abdominal pain and vomiting  Genitourinary: Negative for dysuria and hematuria  Musculoskeletal: Negative for arthralgias and back pain  Skin: Negative for color change and rash  Neurological: Positive for weakness  Negative for seizures and syncope  All other systems reviewed and are negative  Past Medical and Surgical History:   Past Medical History:   Diagnosis Date   • Diabetes mellitus ([facility] )        History reviewed  No pertinent surgical history  Meds/Allergies:  Prior to Admission medications    Medication Sig Start Date End Date Taking? Authorizing Provider   hydrOXYzine HCL (ATARAX) 25 mg tablet Take 1 tablet (25 mg total) by mouth every 6 (six) hours As needed for itching 1/8/20   Estella Sheets,    levETIRAcetam (KEPPRA) 500 mg tablet Take 1 tablet (500 mg total) by mouth every 12 (twelve) hours 11/29/20   Meredith Phan,    metFORMIN (GLUCOPHAGE-XR) 500 mg 24 hr tablet TAKE 4 TABLETS ONCE DAILY 10/14/19   Historical Provider, MD   pioglitazone (ACTOS) 30 mg tablet Take 30 mg by mouth daily 4/17/19   Historical Provider, MD   simvastatin (ZOCOR) 40 mg tablet TAKE 1 TABLET NIGHTLY 10/14/19   Historical Provider, MD BROWN have reviewed home medications using recent Epic encounter  Allergies:    Allergies Allergen Reactions   • Penicillin G      Other reaction(s): Dizzy       Social History:  Marital Status: Single   Occupation: na  Patient Pre-hospital Living Situation: Home  Patient Pre-hospital Level of Mobility: unable to be assessed at time of evaluation  Patient Pre-hospital Diet Restrictions: diabetic diet   Substance Use History:   Social History     Substance and Sexual Activity   Alcohol Use Never     Social History     Tobacco Use   Smoking Status Never   Smokeless Tobacco Never     Social History     Substance and Sexual Activity   Drug Use Never       Family History:  History reviewed  No pertinent family history  Physical Exam:     Vitals:   Blood Pressure: 131/60 (01/30/23 2134)  Pulse: 101 (01/30/23 2134)  Temperature: 98 4 °F (36 9 °C) (01/30/23 2134)  Temp Source: Oral (01/30/23 2134)  Respirations: 17 (01/30/23 2134)  SpO2: 98 % (01/30/23 2134)    Physical Exam  Constitutional:       General: She is not in acute distress  Appearance: Normal appearance  She is not toxic-appearing  Cardiovascular:      Rate and Rhythm: Regular rhythm  Tachycardia present  Heart sounds: Normal heart sounds  No murmur heard  Pulmonary:      Effort: Pulmonary effort is normal  No respiratory distress  Breath sounds: Normal breath sounds  No wheezing  Abdominal:      General: Abdomen is flat  There is no distension  Palpations: Abdomen is soft  Tenderness: There is no abdominal tenderness  Neurological:      General: No focal deficit present  Mental Status: She is alert and oriented to person, place, and time  Mental status is at baseline  Motor: No weakness            Additional Data:     Lab Results:  Results from last 7 days   Lab Units 01/30/23 1821   WBC Thousand/uL 3 58*   HEMOGLOBIN g/dL 6 0*   HEMATOCRIT % 17 9*   PLATELETS Thousands/uL 96*   NEUTROS PCT % 66   LYMPHS PCT % 30   MONOS PCT % 4   EOS PCT % 0     Results from last 7 days   Lab Units 01/30/23 1821 SODIUM mmol/L 134*   POTASSIUM mmol/L 4 4   CHLORIDE mmol/L 98   CO2 mmol/L 24   BUN mg/dL 13   CREATININE mg/dL 0 88   ANION GAP mmol/L 12   CALCIUM mg/dL 8 7   ALBUMIN g/dL 3 6   TOTAL BILIRUBIN mg/dL 1 29*   ALK PHOS U/L 51   ALT U/L 25   AST U/L 47*   GLUCOSE RANDOM mg/dL 158*                 Results from last 7 days   Lab Units 01/30/23  1825 01/30/23  1821   LACTIC ACID mmol/L 1 4  --    PROCALCITONIN ng/ml  --  0 30*       Lines/Drains:  Invasive Devices     Peripheral Intravenous Line  Duration           Peripheral IV 01/30/23 Right Hand <1 day                    Imaging: Reviewed radiology reports from this admission including: chest xray  XR chest portable    (Results Pending)   CT chest abdomen pelvis w contrast    (Results Pending)       EKG and Other Studies Reviewed on Admission:   · EKG: pending scan into epic chart  ** Please Note: This note has been constructed using a voice recognition system   **

## 2023-01-31 NOTE — PLAN OF CARE
Problem: PAIN - ADULT  Goal: Verbalizes/displays adequate comfort level or baseline comfort level  Description: Interventions:  - Encourage patient to monitor pain and request assistance  - Assess pain using appropriate pain scale  - Administer analgesics based on type and severity of pain and evaluate response  - Implement non-pharmacological measures as appropriate and evaluate response  - Consider cultural and social influences on pain and pain management  - Notify physician/advanced practitioner if interventions unsuccessful or patient reports new pain  Outcome: Progressing     Problem: INFECTION - ADULT  Goal: Absence or prevention of progression during hospitalization  Description: INTERVENTIONS:  - Assess and monitor for signs and symptoms of infection  - Monitor lab/diagnostic results  - Monitor all insertion sites, i e  indwelling lines, tubes, and drains  - Monitor endotracheal if appropriate and nasal secretions for changes in amount and color  - Reading appropriate cooling/warming therapies per order  - Administer medications as ordered  - Instruct and encourage patient and family to use good hand hygiene technique  - Identify and instruct in appropriate isolation precautions for identified infection/condition  Outcome: Progressing  Goal: Absence of fever/infection during neutropenic period  Description: INTERVENTIONS:  - Monitor WBC    Outcome: Progressing     Problem: SAFETY ADULT  Goal: Patient will remain free of falls  Description: INTERVENTIONS:  - Educate patient/family on patient safety including physical limitations  - Instruct patient to call for assistance with activity   - Consult OT/PT to assist with strengthening/mobility   - Keep Call bell within reach  - Keep bed low and locked with side rails adjusted as appropriate  - Keep care items and personal belongings within reach  - Initiate and maintain comfort rounds  - Make Fall Risk Sign visible to staff  - Offer Toileting every 2 Hours, in advance of need  - Initiate/Maintain bed/chair alarm  - Obtain necessary fall risk management equipment: yellow nonslip socks  - Apply yellow socks and bracelet for high fall risk patients  - Consider moving patient to room near nurses station  Outcome: Progressing  Goal: Maintain or return to baseline ADL function  Description: INTERVENTIONS:  -  Assess patient's ability to carry out ADLs; assess patient's baseline for ADL function and identify physical deficits which impact ability to perform ADLs (bathing, care of mouth/teeth, toileting, grooming, dressing, etc )  - Assess/evaluate cause of self-care deficits   - Assess range of motion  - Assess patient's mobility; develop plan if impaired  - Assess patient's need for assistive devices and provide as appropriate  - Encourage maximum independence but intervene and supervise when necessary  - Involve family in performance of ADLs  - Assess for home care needs following discharge   - Consider OT consult to assist with ADL evaluation and planning for discharge  - Provide patient education as appropriate  Outcome: Progressing  Goal: Maintains/Returns to pre admission functional level  Description: INTERVENTIONS:  - Perform BMAT or MOVE assessment daily    - Set and communicate daily mobility goal to care team and patient/family/caregiver  - Collaborate with rehabilitation services on mobility goals if consulted  - Perform Range of Motion 2 times a day  - Reposition patient every 2 hours    - Dangle patient 2 times a day  - Stand patient 2 times a day  - Ambulate patient 2 times a day  - Out of bed to chair 2 times a day   - Out of bed for meals 2 times a day  - Out of bed for toileting  - Record patient progress and toleration of activity level   Outcome: Progressing     Problem: DISCHARGE PLANNING  Goal: Discharge to home or other facility with appropriate resources  Description: INTERVENTIONS:  - Identify barriers to discharge w/patient and caregiver  - Arrange for needed discharge resources and transportation as appropriate  - Identify discharge learning needs (meds, wound care, etc )  - Arrange for interpretive services to assist at discharge as needed  - Refer to Case Management Department for coordinating discharge planning if the patient needs post-hospital services based on physician/advanced practitioner order or complex needs related to functional status, cognitive ability, or social support system  Outcome: Progressing     Problem: Knowledge Deficit  Goal: Patient/family/caregiver demonstrates understanding of disease process, treatment plan, medications, and discharge instructions  Description: Complete learning assessment and assess knowledge base    Interventions:  - Provide teaching at level of understanding  - Provide teaching via preferred learning methods  Outcome: Progressing

## 2023-01-31 NOTE — ASSESSMENT & PLAN NOTE
· Present on admission- tachycardia, fevers, leukopenia, tachypnea   · Likely viral sepsis with superimposed bacterial pneumonia given procal+  · Continue ceftriaxone and remdesivir  · CT abdomen pelvis-Consolidative and groundglass opacity in the lateral right upper lobe consistent with a dense lateral right upper lobe pneumonia  Appearance of gastric wall thickening which could be related to underdistention or possibly gastritis     · Blood cultures pending

## 2023-01-31 NOTE — ASSESSMENT & PLAN NOTE
Lab Results   Component Value Date    HGBA1C 7 4 (H) 09/27/2022       No results for input(s): POCGLU in the last 72 hours      Blood Sugar Average: Last 72 hrs:  · Adequately controlled  · Start sliding scale for now  · Monitor glucose levels

## 2023-01-31 NOTE — ASSESSMENT & PLAN NOTE
Patient was disoriented and off from her baseline mentation per family on admission  Daughter at the bedside who she does live with her and states that patient's mentation is back to baseline  Was likely in the setting of COVID-19 and sepsis  Monitor mentation

## 2023-01-31 NOTE — PROGRESS NOTES
[address] [facility]  Progress Note - Juana Mora 1955, 75 y o  female MRN: [id number]  Unit/Bed#: ED 06 Encounter: [id number]  Primary Care Provider: Gem Rubio MD   Date and time admitted to hospital: 1/30/2023  5:14 PM    * Sepsis [facility])  Assessment & Plan  · Present on admission- tachycardia, fevers, leukopenia, tachypnea   · Likely viral sepsis with superimposed bacterial pneumonia given procal+  · Continue ceftriaxone and remdesivir  · CT abdomen pelvis-Consolidative and groundglass opacity in the lateral right upper lobe consistent with a dense lateral right upper lobe pneumonia  Appearance of gastric wall thickening which could be related to underdistention or possibly gastritis     · Blood cultures pending    Toxic encephalopathy  Assessment & Plan  Patient was disoriented and off from her baseline mentation per family on admission  Daughter at the bedside who she does live with her and states that patient's mentation is back to baseline  Was likely in the setting of COVID-19 and sepsis  Monitor mentation    COVID-19  Assessment & Plan  Tested positive on 1/30  Stable on room air  Continue remdesivir  Supportive care    Pancytopenia ([address] )  Assessment & Plan  · Present on admission  · Leukopenia, anemia, thrombocytopenia  · Could be reactive in setting of sepsis  · Given 1 unit PRBC in ED- hemoglobin has remained stable since transfusion  · CT chest, abdomen, pelvis Appearance of gastric wall thickening which could be related to underdistention or possibly gastritis Consolidative and groundglass opacity in the lateral right upper lobe consistent with a dense lateral right upper lobe pneumonia   · Patient reports some possible hematuria today, UA ordered with reflex to culture      Seizure-like activity ([address] )  Assessment & Plan  · Continue home dose keppra    DM type 2 (diabetes mellitus, type 2) ([facility] )  Assessment & Plan  Lab Results   Component Value Date    HGBA1C 7 4 (H) 09/27/2022 No results for input(s): POCGLU in the last 72 hours  Blood Sugar Average: Last 72 hrs:  · Adequately controlled  · Start sliding scale for now  · Monitor glucose levels       VTE Pharmacologic Prophlaxis: VTE Score: 3 Moderate Risk (Score 3-4) - Pharmacological DVT Prophylaxis Ordered: Contraindicated in the setting of anemia and possible bleeding  Patient Centered Rounds: I performed bedside rounds with nursing staff today  Discussions with Specialists or Other Care Team Provider: Reviewed previous providers notes    Education and Discussions with Family / Patient: Discussed with patient and daughter at bedside    Time Spent for Care: 20 minutes  More than 50% of total time spent on counseling and coordination of care as described above  Current Length of Stay: 0 day(s)  Current Patient Status: Observation   Certification Statement: The patient will continue to require additional inpatient hospital stay due to Monitoring of hemoglobin, continued antibiotics and remdesivirPending UA  Discharge Plan: Anticipate discharge in 24-48 hrs to home  Code Status: Level 1 - Full Code    Subjective:   Patient is back at her baseline mentation denies any chest pain chest tightness shortness of breath or difficulty breathing does report that she may be experiencing some hematuria we will follow-up UA otherwise offers no acute complaints at this time    Objective:     Vitals:   Temp (24hrs), Av 9 °F (37 2 °C), Min:98 4 °F (36 9 °C), Max:101 4 °F (38 6 °C)    Temp:  [98 4 °F (36 9 °C)-101 4 °F (38 6 °C)] 98 8 °F (37 1 °C)  HR:  [] 98  Resp:  [14-22] 22  BP: (111-141)/(57-73) 141/69  SpO2:  [92 %-100 %] 97 %  Body mass index is 25 18 kg/m²  Input and Output Summary (last 24 hours):      Intake/Output Summary (Last 24 hours) at 2023 1152  Last data filed at 2023 0217  Gross per 24 hour   Intake 640 ml   Output --   Net 640 ml       Physical Exam:   Physical Exam  Vitals and nursing note reviewed  Constitutional:       General: She is not in acute distress  Appearance: She is ill-appearing  Cardiovascular:      Rate and Rhythm: Normal rate  Pulmonary:      Effort: No respiratory distress  Abdominal:      Palpations: Abdomen is soft  Musculoskeletal:         General: Normal range of motion  Skin:     General: Skin is warm  Coloration: Skin is pale  Neurological:      Mental Status: She is alert  Mental status is at baseline  Psychiatric:         Mood and Affect: Mood normal          Additional Data:     Labs:  Results from last 7 days   Lab Units 01/31/23  0756 01/30/23  1821   WBC Thousand/uL 3 27* 3 58*   HEMOGLOBIN g/dL 8 6* 6 0*   HEMATOCRIT % 24 3* 17 9*   PLATELETS Thousands/uL 94* 96*   NEUTROS PCT %  --  66   LYMPHS PCT %  --  30   MONOS PCT %  --  4   EOS PCT %  --  0     Results from last 7 days   Lab Units 01/31/23  0521 01/30/23  1821   SODIUM mmol/L 137 134*   POTASSIUM mmol/L 4 0 4 4   CHLORIDE mmol/L 104 98   CO2 mmol/L 21 24   BUN mg/dL 7 13   CREATININE mg/dL 0 67 0 88   ANION GAP mmol/L 12 12   CALCIUM mg/dL 7 4* 8 7   ALBUMIN g/dL  --  3 6   TOTAL BILIRUBIN mg/dL  --  1 29*   ALK PHOS U/L  --  51   ALT U/L  --  25   AST U/L  --  47*   GLUCOSE RANDOM mg/dL 165* 158*                 Results from last 7 days   Lab Units 01/30/23  1825 01/30/23  1821   LACTIC ACID mmol/L 1 4  --    PROCALCITONIN ng/ml  --  0 30*       Lines/Drains:  Invasive Devices     Peripheral Intravenous Line  Duration           Peripheral IV 01/30/23 Right Hand <1 day                      Imaging: Reviewed radiology reports from this admission including: abdominal/pelvic CT    Recent Cultures (last 7 days):   Results from last 7 days   Lab Units 01/30/23  1821   BLOOD CULTURE  Received in Microbiology Lab  Culture in Progress  Received in Microbiology Lab  Culture in Progress         Last 24 Hours Medication List:   Current Facility-Administered Medications   Medication Dose Route Frequency Provider Last Rate   • acetaminophen  650 mg Oral Q6H PRN Tirpp Wu MD     • calcium carbonate  1,000 mg Oral Daily PRN Tripp Wu MD     • cefTRIAXone  1,000 mg Intravenous Q24H Tripp Wu MD Stopped (01/30/23 0640)   • docusate sodium  100 mg Oral BID Tripp Wu MD     • levETIRAcetam  500 mg Oral Q12H [address] Tripp Wu MD     • ondansetron  4 mg Intravenous Q6H PRN Tripp Wu MD     • remdesivir  100 mg Intravenous Q24H Tripp Wu MD     • senna  1 tablet Oral Daily Tripp Wu MD     • sodium chloride (PF)  3 mL Intravenous Q8H Kendall Mendez MD          Today, Patient Was Seen By: YUE Weathers    **Please Note: This note may have been constructed using a voice recognition system  **

## 2023-01-31 NOTE — UTILIZATION REVIEW
Initial Clinical Review    Admission: Date/Time/Statement:   Admission Orders (From admission, onward)     Ordered        01/30/23 1909  Place in Observation  Once                      01/31/23 1235  Inpatient Admission  Once        Transfer Service: Hospitalist       Question Answer Comment   Level of Care Med Surg    Estimated length of stay More than 2 Midnights    Certification I certify that inpatient services are medically necessary for this patient for a duration of greater than two midnights  See H&P and MD Progress Notes for additional information about the patient's course of treatment  01/31/23 1235   OBSERVATION   1/30 @   Praveen Taverasmarlen[address] 65 ADMISSIOn  1/31 @  1235  The patient will continue to require additional inpatient hospital stay due to Monitoring of hemoglobin, continued antibiotics and remdesivirPending UA    ED Arrival Information     Expected   -    Arrival   1/30/2023 16:41    Acuity   Emergent            Means of arrival   Walk-In    Escorted by   Family Member    Service   Hospitalist    Admission type   Emergency            Arrival complaint   Altered Mental Status,Covid+            Chief Complaint   Patient presents with   • Altered Mental Status     Daughter reports pt tested positive today for covid, symptoms started last week  She noticed today when she came home from [facility] that pt wasn't acting herself and was seemed slower than normal and confused  Initial Presentation: 75 y o  female presents to ED from home with multiple symptoms including weakness, diarrhea and poor po intake  Went to Urgent Care and  Tested positive for  COVID  Sent to ED for further eval   PMH  Is  Seizure disorder and DM  Met sepsis criteria in ED with  Tachycardia, fever, tachypnea and leukopenia  Hemoglobin  6 and transfused 1  UPRBC  Admit  Observation with Sepsis, Pancytopenia, Seizure like activity and plan is   Monitor labs,  IV  Remdesivir,  KATERINA, Ct scan      1/31   IP ADMISSION  Continue  IV  Remdesivir  and KATERINA  Mentation back to baseline  Likely  Viral sepsis given superimposed pneumonia and  procal +           ED Triage Vitals [01/30/23 1644]   Temperature Pulse Respirations Blood Pressure SpO2   (!) 101 4 °F (38 6 °C) (!) 116 14 133/73 100 %      Temp Source Heart Rate Source Patient Position - Orthostatic VS BP Location FiO2 (%)   Oral Monitor Sitting Right arm --      Pain Score       No Pain          Wt Readings from Last 1 Encounters:   01/31/23 70 8 kg (156 lb)     Additional Vital Signs:    98 8 °F (37 1 °C) 110 Abnormal  20 129/72 -- 96 % None (Room air) --   01/31/23 0040 98 6 °F (37 °C) 99 16 119/64 -- 95 % None (Room air) --   01/31/23 0028 98 5 °F (36 9 °C) 98 18 127/61 88 95 % None (Room air) Lying   01/30/23 2345 98 5 °F (36 9 °C) 98 22 129/71 94 95 % None (Room air) --   01/30/23 2245 98 6 °F (37 °C) 98 22 124/68 89 95 % None (Room air) Sitting   01/30/23 2230 -- 101 20 128/64 -- 99 % None (Room air) --   01/30/23 2215 -- 96 20 111/60 81 99 % None (Room air) --   01/30/23 2200 -- 97 21 121/60 85 100 % None (Room air) --   01/30/23 2145 98 6 °F (37 °C) 97 21 133/68 95 100 % None (Room air) --   01/30/23 2134 98 4 °F (36 9 °C) 101 17 131/60 -- 98 % None (Room air) --   01/30/23 2030 -- 104 18 115/57 79 92 % None (Room air) --   01/30/23 1930 -- 105 18 113/59 79 100 % None (Room air) --   01/30/23 1900 -- 108 Abnormal  22 113/58 80 97 % None (Room air) --   01/30/23 1800 -- 109 Abnormal  18 -- -- 99 % -- --   01/30/23 1644 101 4 °F (38 6 °C) Abnormal  116 Abnormal  14 133/73 98 100 % None (Room air) Sitting       Pertinent Labs/Diagnostic Test Results:   CT chest abdomen pelvis w contrast   Final Result by Iman Swanson MD (01/31 8606)      Consolidative and groundglass opacity in the lateral right upper lobe consistent with a dense lateral right upper lobe pneumonia    Although the patient has tested positive for COVID-19 infection, the radiographic and CT appearance of the lateral right    upper lobe finding is most consistent with bacterial pneumonia  Continued radiographic follow-up is recommended to document complete interval resolution  Appearance of gastric wall thickening which could be related to underdistention or possibly gastritis  Workstation performed: [id number]         XR chest portable   Final Result by Tegan Salazar MD (01/31 7203)      Alveolar consolidation in the lateral right upper lobe consistent with pneumonia  Based on CT and radiographic appearance the finding most likely represents bacterial pneumonia rather than viral pneumonia due to COVID-19 infection  Continued radiographic follow-up is recommended to document complete interval resolution  Workstation performed: [id number]               Results from last 7 days   Lab Units 01/31/23  0756 01/30/23  1821   WBC Thousand/uL 3 27* 3 58*   HEMOGLOBIN g/dL 8 6* 6 0*   HEMATOCRIT % 24 3* 17 9*   PLATELETS Thousands/uL 94* 96*   NEUTROS ABS Thousands/µL  --  2 32         Results from last 7 days   Lab Units 01/31/23  0521 01/30/23  1821   SODIUM mmol/L 137 134*   POTASSIUM mmol/L 4 0 4 4   CHLORIDE mmol/L 104 98   CO2 mmol/L 21 24   ANION GAP mmol/L 12 12   BUN mg/dL 7 13   CREATININE mg/dL 0 67 0 88   EGFR ml/min/1 73sq m 90 67   CALCIUM mg/dL 7 4* 8 7   MAGNESIUM mg/dL 1 4*  --      Results from last 7 days   Lab Units 01/30/23  1821   AST U/L 47*   ALT U/L 25   ALK PHOS U/L 51   TOTAL PROTEIN g/dL 7 9   ALBUMIN g/dL 3 6   TOTAL BILIRUBIN mg/dL 1 29*         Results from last 7 days   Lab Units 01/31/23  0521 01/30/23  1821   GLUCOSE RANDOM mg/dL 165* 158*               Results from last 7 days   Lab Units 01/30/23  1821   PROCALCITONIN ng/ml 0 30*     Results from last 7 days   Lab Units 01/30/23  1825   LACTIC ACID mmol/L 1 4               Results from last 7 days   Lab Units 01/30/23  1821   BLOOD CULTURE  Received in Microbiology Lab  Culture in Progress  Received in Microbiology Lab  Culture in Progress                 ED Treatment:   Medication Administration from 01/30/2023 1641 to 01/31/2023 0912       Date/Time Order Dose Route Action Comments     01/31/2023 0525 EST sodium chloride (PF) 0 9 % injection 3 mL 3 mL Intravenous Given --     01/30/2023 2144 EST sodium chloride (PF) 0 9 % injection 3 mL 3 mL Intravenous Given --     01/30/2023 1828 EST sodium chloride (PF) 0 9 % injection 3 mL 3 mL Intravenous Given --     01/30/2023 1903 EST sodium chloride 0 9 % bolus 1,000 mL 0 mL Intravenous Stopped --     01/30/2023 1829 EST sodium chloride 0 9 % bolus 1,000 mL 1,000 mL Intravenous New Bag --     01/30/2023 2002 EST sodium chloride 0 9 % bolus 1,000 mL 0 mL Intravenous Stopped --     01/30/2023 1902 EST sodium chloride 0 9 % bolus 1,000 mL 1,000 mL Intravenous New Bag --     01/30/2023 2134 EST sodium chloride 0 9 % bolus 1,000 mL 0 mL Intravenous Stopped --     01/30/2023 2055 EST sodium chloride 0 9 % bolus 1,000 mL 1,000 mL Intravenous New Bag --     01/30/2023 2040 EST levofloxacin (LEVAQUIN) IVPB (premix in dextrose) 750 mg 150 mL 0 mg Intravenous Stopped --     01/30/2023 1828 EST levofloxacin (LEVAQUIN) IVPB (premix in dextrose) 750 mg 150 mL 750 mg Intravenous New Bag --     01/30/2023 1828 EST acetaminophen (TYLENOL) tablet 650 mg 650 mg Oral Given --     01/30/2023 2150 EST ceftriaxone (ROCEPHIN) 1 g/50 mL in dextrose IVPB 0 mg Intravenous Stopped --     01/30/2023 2120 EST ceftriaxone (ROCEPHIN) 1 g/50 mL in dextrose IVPB 1,000 mg Intravenous New Bag --     01/31/2023 0809 EST docusate sodium (COLACE) capsule 100 mg 100 mg Oral Given --     01/30/2023 2121 EST docusate sodium (COLACE) capsule 100 mg 100 mg Oral Given --     01/31/2023 0809 EST senna (SENOKOT) tablet 8 6 mg 8 6 mg Oral Given --     01/31/2023 0809 EST levETIRAcetam (KEPPRA) tablet 500 mg 500 mg Oral Given --     01/30/2023 2121 EST levETIRAcetam (KEPPRA) tablet 500 mg 500 mg Oral Given --     01/31/2023 0217 EST remdesivir Leah Pump) 200 mg in sodium chloride 0 9 % 290 mL IVPB 0 mg Intravenous Stopped --     01/31/2023 0147 EST remdesivir (Veklury) 200 mg in sodium chloride 0 9 % 290 mL IVPB 200 mg Intravenous New Bag --     01/30/2023 2127 EST iohexol (OMNIPAQUE) 350 MG/ML injection (SINGLE-DOSE) 100 mL 100 mL Intravenous Given --        Present on Admission:  • Sepsis ([facility] )  • Pancytopenia ([facility] )  • DM type 2 (diabetes mellitus, type 2) [facility] )  • Seizure-like activity (H[facility])      Admitting Diagnosis: Altered mental status [R41 82]  Age/Sex: 75 y o  female  Admission Orders:  Scheduled Medications:  cefTRIAXone, 1,000 mg, Intravenous, Q24H  docusate sodium, 100 mg, Oral, BID  levETIRAcetam, 500 mg, Oral, Q12H [address]  remdesivir, 100 mg, Intravenous, Q24H  senna, 1 tablet, Oral, Daily  sodium chloride (PF), 3 mL, Intravenous, Q8H [address]      Continuous IV Infusions:     PRN Meds:  acetaminophen, 650 mg, Oral, Q6H PRN  calcium carbonate, 1,000 mg, Oral, Daily PRN  ondansetron, 4 mg, Intravenous, Q6H PRN          Network Utilization Review Department  ATTENTION: Please call with any questions or concerns to [phone] and carefully listen to the prompts so that you are directed to the right person  All voicemails are confidential   Osei Peoples all requests for admission clinical reviews, approved or denied determinations and any other requests to dedicated fax number below belonging to the campus where the patient is receiving treatment   List of dedicated fax numbers for the Facilities:  [address] [place] [address] DENIALS (Administrative/Medical Necessity) [phone]   [address] (Maternity/NICU/Pediatrics) [phone]   [address] [facility] [address] [place] [id number] [address] Carbone [facility] [address] [facility] [address] [phone]

## 2023-01-31 NOTE — ASSESSMENT & PLAN NOTE
· Present on admission- tachycardia, fevers, leukopenia, tachypnea   · Likely viral sepsis with superimposed bacterial pneumonia given procal+  · Given dose of IV levaquin in ED   · Adjust to ceftriaxone, Add remdesivir  · Order CT imaging for further evaluation  · Follow up infectious work up

## 2023-02-01 VITALS
OXYGEN SATURATION: 98 % | BODY MASS INDEX: 25.07 KG/M2 | WEIGHT: 156 LBS | SYSTOLIC BLOOD PRESSURE: 120 MMHG | HEIGHT: 66 IN | TEMPERATURE: 98.1 F | DIASTOLIC BLOOD PRESSURE: 87 MMHG | RESPIRATION RATE: 18 BRPM | HEART RATE: 106 BPM

## 2023-02-01 LAB
ANION GAP SERPL CALCULATED.3IONS-SCNC: 11 MMOL/L (ref 4–13)
BUN SERPL-MCNC: 9 MG/DL (ref 5–25)
CALCIUM SERPL-MCNC: 8.4 MG/DL (ref 8.3–10.1)
CHLORIDE SERPL-SCNC: 103 MMOL/L (ref 96–108)
CO2 SERPL-SCNC: 25 MMOL/L (ref 21–32)
CREAT SERPL-MCNC: 0.73 MG/DL (ref 0.6–1.3)
GFR SERPL CREATININE-BSD FRML MDRD: 84 ML/MIN/1.73SQ M
GLUCOSE SERPL-MCNC: 168 MG/DL (ref 65–140)
HCT VFR BLD AUTO: 21.6 % (ref 34.8–46.1)
HGB BLD-MCNC: 7.6 G/DL (ref 11.5–15.4)
MCH RBC QN AUTO: 38.6 PG (ref 26.8–34.3)
MCHC RBC AUTO-ENTMCNC: 35.2 G/DL (ref 31.4–37.4)
MCV RBC AUTO: 110 FL (ref 82–98)
PLATELET # BLD AUTO: 100 THOUSANDS/UL (ref 149–390)
PMV BLD AUTO: 12.6 FL (ref 8.9–12.7)
POTASSIUM SERPL-SCNC: 3.7 MMOL/L (ref 3.5–5.3)
RBC # BLD AUTO: 1.97 MILLION/UL (ref 3.81–5.12)
SODIUM SERPL-SCNC: 139 MMOL/L (ref 135–147)
WBC # BLD AUTO: 2.44 THOUSAND/UL (ref 4.31–10.16)

## 2023-02-01 RX ADMIN — SODIUM CHLORIDE 3 ML: 9 INJECTION INTRAMUSCULAR; INTRAVENOUS; SUBCUTANEOUS at 06:13

## 2023-02-01 RX ADMIN — LEVETIRACETAM 500 MG: 500 TABLET, FILM COATED ORAL at 08:42

## 2023-02-01 RX ADMIN — DOCUSATE SODIUM 100 MG: 100 CAPSULE, LIQUID FILLED ORAL at 08:42

## 2023-02-01 RX ADMIN — STANDARDIZED SENNA CONCENTRATE 8.6 MG: 8.6 TABLET ORAL at 08:42

## 2023-02-01 NOTE — CASE MANAGEMENT
Case Management Discharge Planning Note    Patient name Maranda Fischer  Location /-59 MRN [id number]  : 1955 Date 2023       Current Admission Date: 2023  Current Admission Diagnosis:Sepsis [facility])   Patient Active Problem List    Diagnosis Date Noted   • COVID-19 2023   • Toxic encephalopathy 2023   • Seizure-like activity ([facility] ) 2020   • Sepsis ([facility] ) 2020   • Pancytopenia ([facility] ) 2020   • Hyperlipidemia associated with type 2 diabetes mellitus ([facility] 2020   • DM type 2 (diabetes mellitus, type 2) ([facility] ) 2018      LOS (days): 1  Geometric Mean LOS (GMLOS) (days): 5 00  Days to GMLOS:3 9     OBJECTIVE:  Risk of Unplanned Readmission Score: 9 82      Current admission status: Inpatient   Preferred Pharmacy:   [facility]  [phone] - [place]  [place] - [address] [phone]  [place]  [address] [place] [address]  Phone: [phone] Fax: [phone]    Primary Care Provider: Laurence Norton MD    Primary Insurance: MEDICARE  Secondary Insurance:     DISCHARGE DETAILS:    Discharge planning discussed with[de-identified] Patient  Freedom of Choice: Yes  Comments - Freedom of Choice: Reviewed - patient agreeable to blanket referral for HHC    CM contacted family/caregiver?: Yes     Contacts  Patient Contacts: Filippo Cardenas (Daughter)  Relationship to Patient[de-identified] Family  Contact Method: Phone  Phone Number: [phone]  Reason/Outcome: Continuity of Care, Discharge [address]         Is the patient interested in [facility] at discharge?: Yes  Via Caren Ayoub requested[de-identified] Nursing, Occupational Therapy, Physical [address] Name[de-identified] Other (blanket referral)  Kim Lilo  Provider[de-identified] PCP  Home Health Services Needed[de-identified] Diabetes Management, Evaluate Functional Status and Safety, Gait/ADL Training, Strengthening/Theraputic Exercises to Improve Function  Homebound Criteria Met[de-identified] Requires the Assistance of Another Person for Safe Ambulation or to Leave the Home, Communicable Disease  Supporting Clincal Findings[de-identified] Fatigues Easliy in [employer], Limited Endurance    DME Referral Provided  Referral made for DME?: No    Other Referral/Resources/Interventions Provided:  Interventions: [facility]  Referral Comments: [place] referral for Silvianotristanu Carlos sent via [employer]      Would you like to participate in our [address] service program?  : No - Declined    Treatment Team Recommendation: Home with [facility]  Discharge Destination Plan[de-identified] Home with Wendy at Discharge : Family

## 2023-02-01 NOTE — PLAN OF CARE
Problem: PAIN - ADULT  Goal: Verbalizes/displays adequate comfort level or baseline comfort level  Description: Interventions:  - Encourage patient to monitor pain and request assistance  - Assess pain using appropriate pain scale  - Administer analgesics based on type and severity of pain and evaluate response  - Implement non-pharmacological measures as appropriate and evaluate response  - Consider cultural and social influences on pain and pain management  - Notify physician/advanced practitioner if interventions unsuccessful or patient reports new pain  Outcome: Progressing     Problem: INFECTION - ADULT  Goal: Absence or prevention of progression during hospitalization  Description: INTERVENTIONS:  - Assess and monitor for signs and symptoms of infection  - Monitor lab/diagnostic results  - Monitor all insertion sites, i e  indwelling lines, tubes, and drains  - Monitor endotracheal if appropriate and nasal secretions for changes in amount and color  - [place] appropriate cooling/warming therapies per order  - Administer medications as ordered  - Instruct and encourage patient and family to use good hand hygiene technique  - Identify and instruct in appropriate isolation precautions for identified infection/condition  Outcome: Progressing  Goal: Absence of fever/infection during neutropenic period  Description: INTERVENTIONS:  - Monitor WBC    Outcome: Progressing     Problem: SAFETY ADULT  Goal: Patient will remain free of falls  Description: INTERVENTIONS:  - Educate patient/family on patient safety including physical limitations  - Instruct patient to call for assistance with activity   - Consult OT/PT to assist with strengthening/mobility   - Keep Call bell within reach  - Keep bed low and locked with side rails adjusted as appropriate  - Keep care items and personal belongings within reach  - Initiate and maintain comfort rounds  - Make Fall Risk Sign visible to staff  - Offer Toileting every  Hours, in advance of need  - Initiate/Maintain alarm  - Obtain necessary fall risk management equipment:   - Apply yellow socks and bracelet for high fall risk patients  - Consider moving patient to room near nurses station  Outcome: Progressing  Goal: Maintain or return to baseline ADL function  Description: INTERVENTIONS:  -  Assess patient's ability to carry out ADLs; assess patient's baseline for ADL function and identify physical deficits which impact ability to perform ADLs (bathing, care of mouth/teeth, toileting, grooming, dressing, etc )  - Assess/evaluate cause of self-care deficits   - Assess range of motion  - Assess patient's mobility; develop plan if impaired  - Assess patient's need for assistive devices and provide as appropriate  - Encourage maximum independence but intervene and supervise when necessary  - Involve family in performance of ADLs  - Assess for home care needs following discharge   - Consider OT consult to assist with ADL evaluation and planning for discharge  - Provide patient education as appropriate  Outcome: Progressing  Goal: Maintains/Returns to pre admission functional level  Description: INTERVENTIONS:  - Perform BMAT or MOVE assessment daily    - Set and communicate daily mobility goal to care team and patient/family/caregiver  - Collaborate with rehabilitation services on mobility goals if consulted  - Perform Range of Motion  times a day  - Reposition patient every  hours    - Dangle patient  times a day  - Stand patient  times a day  - Ambulate patient  times a day  - Out of bed to chair  times a day   - Out of bed for meals  times a day  - Out of bed for toileting  - Record patient progress and toleration of activity level   Outcome: Progressing     Problem: DISCHARGE PLANNING  Goal: Discharge to home or other facility with appropriate resources  Description: INTERVENTIONS:  - Identify barriers to discharge w/patient and caregiver  - Arrange for needed discharge resources and transportation as appropriate  - Identify discharge learning needs (meds, wound care, etc )  - Arrange for interpretive services to assist at discharge as needed  - Refer to Case Management Department for coordinating discharge planning if the patient needs post-hospital services based on physician/advanced practitioner order or complex needs related to functional status, cognitive ability, or social support system  Outcome: Progressing     Problem: Knowledge Deficit  Goal: Patient/family/caregiver demonstrates understanding of disease process, treatment plan, medications, and discharge instructions  Description: Complete learning assessment and assess knowledge base    Interventions:  - Provide teaching at level of understanding  - Provide teaching via preferred learning methods  Outcome: Progressing     Problem: Potential for Falls  Goal: Patient will remain free of falls  Description: INTERVENTIONS:  - Educate patient/family on patient safety including physical limitations  - Instruct patient to call for assistance with activity   - Consult OT/PT to assist with strengthening/mobility   - Keep Call bell within reach  - Keep bed low and locked with side rails adjusted as appropriate  - Keep care items and personal belongings within reach  - Initiate and maintain comfort rounds  - Make Fall Risk Sign visible to staff  - Offer Toileting every  Hours, in advance of need  - Initiate/Maintain alarm  - Obtain necessary fall risk management equipment:   - Apply yellow socks and bracelet for high fall risk patients  - Consider moving patient to room near nurses station  Outcome: Progressing     Problem: Prexisting or High Potential for Compromised Skin Integrity  Goal: Skin integrity is maintained or improved  Description: INTERVENTIONS:  - Identify patients at risk for skin breakdown  - Assess and monitor skin integrity  - Assess and monitor nutrition and hydration status  - Monitor labs   - Assess for incontinence   - Turn and reposition patient  - Assist with mobility/ambulation  - Relieve pressure over bony prominences  - Avoid friction and shearing  - Provide appropriate hygiene as needed including keeping skin clean and dry  - Evaluate need for skin moisturizer/barrier cream  - Collaborate with interdisciplinary team   - Patient/family teaching  - Consider wound care consult   Outcome: Progressing

## 2023-02-01 NOTE — PLAN OF CARE
Problem: PAIN - ADULT  Goal: Verbalizes/displays adequate comfort level or baseline comfort level  Description: Interventions:  - Encourage patient to monitor pain and request assistance  - Assess pain using appropriate pain scale  - Administer analgesics based on type and severity of pain and evaluate response  - Implement non-pharmacological measures as appropriate and evaluate response  - Consider cultural and social influences on pain and pain management  - Notify physician/advanced practitioner if interventions unsuccessful or patient reports new pain  2/1/2023 1054 by Brittany Lange RN  Outcome: Progressing     Problem: INFECTION - ADULT  Goal: Absence or prevention of progression during hospitalization  Description: INTERVENTIONS:  - Assess and monitor for signs and symptoms of infection  - Monitor lab/diagnostic results  - Monitor all insertion sites, i e  indwelling lines, tubes, and drains  - Monitor endotracheal if appropriate and nasal secretions for changes in amount and color  - [place] appropriate cooling/warming therapies per order  - Administer medications as ordered  - Instruct and encourage patient and family to use good hand hygiene technique  - Identify and instruct in appropriate isolation precautions for identified infection/condition  2/1/2023 1054 by Brittany Lange RN  Outcome: Progressing  Goal: Absence of fever/infection during neutropenic period  Description: INTERVENTIONS:  - Monitor WBC    2/1/2023 1054 by Brittany Lange RN  Outcome: Progressing     Problem: SAFETY ADULT  Goal: Patient will remain free of falls  Description: INTERVENTIONS:  - Educate patient/family on patient safety including physical limitations  - Instruct patient to call for assistance with activity   - Consult OT/PT to assist with strengthening/mobility   - Keep Call bell within reach  - Keep bed low and locked with side rails adjusted as appropriate  - Keep care items and personal belongings within reach  - Initiate and maintain comfort rounds  - Make Fall Risk Sign visible to staff  - Offer Toileting every  Hours, in advance of need  - Initiate/Maintain alarm  - Obtain necessary fall risk management equipment:   - Apply yellow socks and bracelet for high fall risk patients  - Consider moving patient to room near nurses station  2/1/2023 1054 by Jasbir Lainez RN  Outcome: Progressing    Goal: Maintain or return to baseline ADL function  Description: INTERVENTIONS:  -  Assess patient's ability to carry out ADLs; assess patient's baseline for ADL function and identify physical deficits which impact ability to perform ADLs (bathing, care of mouth/teeth, toileting, grooming, dressing, etc )  - Assess/evaluate cause of self-care deficits   - Assess range of motion  - Assess patient's mobility; develop plan if impaired  - Assess patient's need for assistive devices and provide as appropriate  - Encourage maximum independence but intervene and supervise when necessary  - Involve family in performance of ADLs  - Assess for home care needs following discharge   - Consider OT consult to assist with ADL evaluation and planning for discharge  - Provide patient education as appropriate  2/1/2023 1054 by Jasbir Lainez RN  Outcome: Progressing  Goal: Maintains/Returns to pre admission functional level  Description: INTERVENTIONS:  - Perform BMAT or MOVE assessment daily    - Set and communicate daily mobility goal to care team and patient/family/caregiver  - Collaborate with rehabilitation services on mobility goals if consulted  - Perform Range of Motion  times a day  - Reposition patient every  hours    - Dangle patient  times a day  - Stand patient  times a day  - Ambulate patient  times a day  - Out of bed to chair  times a day   - Out of bed for meals  times a day  - Out of bed for toileting  - Record patient progress and toleration of activity level   2/1/2023 1054 by Jasbir Lainez RN  Outcome: Progressing     Problem: DISCHARGE PLANNING  Goal: Discharge to home or other facility with appropriate resources  Description: INTERVENTIONS:  - Identify barriers to discharge w/patient and caregiver  - Arrange for needed discharge resources and transportation as appropriate  - Identify discharge learning needs (meds, wound care, etc )  - Arrange for interpretive services to assist at discharge as needed  - Refer to Case Management Department for coordinating discharge planning if the patient needs post-hospital services based on physician/advanced practitioner order or complex needs related to functional status, cognitive ability, or social support system  2/1/2023 1054 by Jordan Taveras RN  Outcome: Progressing     Problem: Knowledge Deficit  Goal: Patient/family/caregiver demonstrates understanding of disease process, treatment plan, medications, and discharge instructions  Description: Complete learning assessment and assess knowledge base    Interventions:  - Provide teaching at level of understanding  - Provide teaching via preferred learning methods  2/1/2023 1054 by Jordan Taveras RN  Outcome: Progressing       Problem: Potential for Falls  Goal: Patient will remain free of falls  Description: INTERVENTIONS:  - Educate patient/family on patient safety including physical limitations  - Instruct patient to call for assistance with activity   - Consult OT/PT to assist with strengthening/mobility   - Keep Call bell within reach  - Keep bed low and locked with side rails adjusted as appropriate  - Keep care items and personal belongings within reach  - Initiate and maintain comfort rounds  - Make Fall Risk Sign visible to staff  - Offer Toileting every  Hours, in advance of need  - Initiate/Maintain alarm  - Obtain necessary fall risk management equipment  - Apply yellow socks and bracelet for high fall risk patients  - Consider moving patient to room near nurses station  2/1/2023 1054 by Jordan Taveras RN  Outcome: Progressing     Problem: Prexisting or High Potential for Compromised Skin Integrity  Goal: Skin integrity is maintained or improved  Description: INTERVENTIONS:  - Identify patients at risk for skin breakdown  - Assess and monitor skin integrity  - Assess and monitor nutrition and hydration status  - Monitor labs   - Assess for incontinence   - Turn and reposition patient  - Assist with mobility/ambulation  - Relieve pressure over bony prominences  - Avoid friction and shearing  - Provide appropriate hygiene as needed including keeping skin clean and dry  - Evaluate need for skin moisturizer/barrier cream  - Collaborate with interdisciplinary team   - Patient/family teaching  - Consider wound care consult   2/1/2023 1054 by Seema Diallo RN  Outcome: Progressing

## 2023-02-01 NOTE — CASE MANAGEMENT
Case Management Discharge Planning Note    Patient name Edi Kwong  Location /-72 MRN [id number]  : 1955 Date 2023       Current Admission Date: 2023  Current Admission Diagnosis:Sepsis [facility])   Patient Active Problem List    Diagnosis Date Noted   • COVID-19 2023   • Toxic encephalopathy 2023   • Seizure-like activity ([facility] ) 2020   • Sepsis ([facility] ) 2020   • Pancytopenia ([facility] ) 2020   • Hyperlipidemia associated with type 2 diabetes mellitus ([facility] ) 2020   • DM type 2 (diabetes mellitus, type 2) ([facility] ) 2018      LOS (days): 1  Geometric Mean LOS (GMLOS) (days): 5 00  Days to GMLOS:3 9     OBJECTIVE:  Risk of Unplanned Readmission Score: 9 82      Current admission status: Inpatient   Preferred Pharmacy:   [facility]  [id number] - [place]  [place] - [phone]  [place]  [address] [place] [address]  Phone: [phone] Fax: [phone]    Primary Care Provider: Darline Cooper MD    Primary Insurance: MEDICARE  Secondary Insurance:     DISCHARGE DETAILS:    Discharge planning discussed with[de-identified] Patient  Freedom of Choice: Yes  Comments - Freedom of Choice: Reviewed - patient agreeable to blanket referral for HHC    CM contacted family/caregiver?: Yes    Contacts  Patient Contacts: Mohinder  (Daughter)  Relationship to Patient[de-identified] Family  Contact Method: Phone  Phone Number: [phone]  Reason/Outcome: Continuity of Care, Discharge [address]         Is the patient interested in [facility] at discharge?: Yes  [address] requested[de-identified] Nursing, Occupational Therapy, Physical [address] Name[de-identified] Other (blanket referral)  [address] [place] [address] Provider[de-identified] PCP  Home Health Services Needed[de-identified] Diabetes Management, Evaluate Functional Status and Safety, Gait/ADL Training, Strengthening/Theraputic Exercises to Improve Function  Homebound Criteria Met[de-identified] Requires the Assistance of Another Person for Safe Ambulation or to Leave the Home, Communicable Disease  Supporting Clincal Findings[de-identified] Fatigues Easliy in [employer], Limited Endurance    DME Referral Provided  Referral made for DME?: No    Other Referral/Resources/Interventions Provided:  Interventions: [facility]  Referral Comments: [place] referral for [facility] sent via [address]  Would you like to participate in our [address] service program?  : No - Declined    Treatment Team Recommendation: Home with [facility]  Discharge Destination Plan[de-identified] Home with [place] at Discharge : Family     [address] confirmed for [facility]  AVS updated  SOC for 24hrs post d/c

## 2023-02-01 NOTE — ASSESSMENT & PLAN NOTE
Patient was disoriented and off from her baseline mentation per family on admission  Daughter at the bedside who she does live with her and states that patient's mentation is back to baseline  Secondary to COVID  Has since resolved

## 2023-02-01 NOTE — DISCHARGE SUMMARY
[facility]  Discharge- [place] 1955, 75 y o  female MRN: [id number]  Unit/Bed#: -01 Encounter: [id number]  Primary Care Provider: Cici Godoy MD   Date and time admitted to hospital: 1/30/2023  5:14 PM    Toxic encephalopathy  Assessment & Plan  Patient was disoriented and off from her baseline mentation per family on admission  Daughter at the bedside who she does live with her and states that patient's mentation is back to baseline  Secondary to COVID  Has since resolved    COVID-19  Assessment & Plan  Tested positive on 1/30  Remains on room air  Medically cleared for discharge    Pancytopenia ([facility] )  Assessment & Plan  · Likely secondary to COVID  · Stable      DM type 2 (diabetes mellitus, type 2) ([facility] )  Assessment & Plan  Lab Results   Component Value Date    HGBA1C 7 4 (H) 09/27/2022       No results for input(s): POCGLU in the last 72 hours  Blood Sugar Average: Last 72 hrs:  · Adequately controlled  · Start sliding scale for now  · Monitor glucose levels     * Sepsis (HCC)  Assessment & Plan  · Present on admission- tachycardia, fevers, leukopenia, tachypnea   · Likely viral sepsis with superimposed bacterial pneumonia given procal+  · Continue ceftriaxone and remdesivir  · CT abdomen pelvis-Consolidative and groundglass opacity in the lateral right upper lobe consistent with a dense lateral right upper lobe pneumonia  Appearance of gastric wall thickening which could be related to underdistention or possibly gastritis     · Blood cultures pending  · UA negative      Discharging Physician / Practitioner: Rachael Thomas MD  PCP: Cici Godoy MD  Admission Date:   Admission Orders (From admission, onward)     Ordered        01/31/23 1235  Inpatient Admission  Once            01/30/23 1909  Place in Observation  Once                      Discharge Date: 02/01/23    Medical Problems     Resolved Problems  Date Reviewed: 2/1/2023   None         Consultations During Hospital Stay:  · none    Procedures Performed:   · none    Significant Findings / Test Results:   XR chest portable    Result Date: 1/31/2023  Impression: Alveolar consolidation in the lateral right upper lobe consistent with pneumonia  Based on CT and radiographic appearance the finding most likely represents bacterial pneumonia rather than viral pneumonia due to COVID-19 infection  Continued radiographic follow-up is recommended to document complete interval resolution  Workstation performed: [id number]     CT chest abdomen pelvis w contrast    Result Date: 1/31/2023  · Impression: Consolidative and groundglass opacity in the lateral right upper lobe consistent with a dense lateral right upper lobe pneumonia  Although the patient has tested positive for COVID-19 infection, the radiographic and CT appearance of the lateral right upper lobe finding is most consistent with bacterial pneumonia  Continued radiographic follow-up is recommended to document complete interval resolution  Appearance of gastric wall thickening which could be related to underdistention or possibly gastritis  Workstation performed: [id number]     Incidental Findings:   · none     Test Results Pending at Discharge (will require follow up):   · none     Outpatient Tests Requested:  · none    Complications:  none    Reason for Admission: [facility]    Hospital Course:     Divine Escalante is a 75 y o  female patient who originally presented to the hospital on 1/30/2023 due to [place]  Was monitored for approximately 48 hours  Never required oxygen  Ultimately discharged with home physical therapy        Please see above list of diagnoses and related plan for additional information  Condition at Discharge: stable     Discharge Day Visit / Exam:     Subjective: Denies chest pain, fevers, cough, shortness of breath  Exam:   Physical Exam  Constitutional:       General: She is not in acute distress  Appearance: She is well-developed  She is not diaphoretic  HENT:      Head: Normocephalic and atraumatic  Nose: Nose normal       Mouth/Throat:      Pharynx: No oropharyngeal exudate  Eyes:      General: No scleral icterus  Right eye: No discharge  Left eye: No discharge  Conjunctiva/sclera: Conjunctivae normal    Neck:      Thyroid: No thyromegaly  Vascular: No JVD  Cardiovascular:      Rate and Rhythm: Normal rate and regular rhythm  Heart sounds: Normal heart sounds  No murmur heard  No friction rub  No gallop  Pulmonary:      Effort: Pulmonary effort is normal  No respiratory distress  Breath sounds: Normal breath sounds  No wheezing or rales  Chest:      Chest wall: No tenderness  Abdominal:      General: Bowel sounds are normal  There is no distension  Palpations: Abdomen is soft  Tenderness: There is no abdominal tenderness  There is no guarding or rebound  Musculoskeletal:         General: No tenderness or deformity  Normal range of motion  Cervical back: Normal range of motion and neck supple  Skin:     General: Skin is warm and dry  Findings: No erythema or rash  Neurological:      Mental Status: She is alert  Mental status is at baseline  Cranial Nerves: No cranial nerve deficit  Sensory: No sensory deficit  Motor: No abnormal muscle tone  Coordination: Coordination normal          Discussion with Family: pt    Discharge instructions/Information to patient and family:   See after visit summary for information provided to patient and family  Provisions for Follow-Up Care:  See after visit summary for information related to follow-up care and any pertinent home health orders  Disposition:     Home    For Discharges to [facility] SNF:   · Not Applicable to this Patient - Not Applicable to this Patient    Planned Readmission: none     Discharge Statement:  I spent 60 minutes discharging the patient   This time was spent on the day of discharge  I had direct contact with the patient on the day of discharge  Greater than 50% of the total time was spent examining patient, answering all patient questions, arranging and discussing plan of care with patient as well as directly providing post-discharge instructions  Additional time then spent on discharge activities  Discharge Medications:  See after visit summary for reconciled discharge medications provided to patient and family        ** Please Note: This note has been constructed using a voice recognition system **

## 2023-02-01 NOTE — PLAN OF CARE
Problem: PAIN - ADULT  Goal: Verbalizes/displays adequate comfort level or baseline comfort level  Description: Interventions:  - Encourage patient to monitor pain and request assistance  - Assess pain using appropriate pain scale  - Administer analgesics based on type and severity of pain and evaluate response  - Implement non-pharmacological measures as appropriate and evaluate response  - Consider cultural and social influences on pain and pain management  - Notify physician/advanced practitioner if interventions unsuccessful or patient reports new pain  Outcome: Progressing     Problem: INFECTION - ADULT  Goal: Absence or prevention of progression during hospitalization  Description: INTERVENTIONS:  - Assess and monitor for signs and symptoms of infection  - Monitor lab/diagnostic results  - Monitor all insertion sites, i e  indwelling lines, tubes, and drains  - Monitor endotracheal if appropriate and nasal secretions for changes in amount and color  - [place] appropriate cooling/warming therapies per order  - Administer medications as ordered  - Instruct and encourage patient and family to use good hand hygiene technique  - Identify and instruct in appropriate isolation precautions for identified infection/condition  Outcome: Progressing  Goal: Absence of fever/infection during neutropenic period  Description: INTERVENTIONS:  - Monitor WBC    Outcome: Progressing     Problem: SAFETY ADULT  Goal: Patient will remain free of falls  Description: INTERVENTIONS:  - Educate patient/family on patient safety including physical limitations  - Instruct patient to call for assistance with activity   - Consult OT/PT to assist with strengthening/mobility   - Keep Call bell within reach  - Keep bed low and locked with side rails adjusted as appropriate  - Keep care items and personal belongings within reach  - Initiate and maintain comfort rounds  - Make Fall Risk Sign visible to staff  - Offer Toileting every  Hours, in advance of need  - Initiate/Maintain alarm  - Obtain necessary fall risk management equipment:   - Apply yellow socks and bracelet for high fall risk patients  - Consider moving patient to room near nurses station  Outcome: Progressing  Goal: Maintain or return to baseline ADL function  Description: INTERVENTIONS:  -  Assess patient's ability to carry out ADLs; assess patient's baseline for ADL function and identify physical deficits which impact ability to perform ADLs (bathing, care of mouth/teeth, toileting, grooming, dressing, etc )  - Assess/evaluate cause of self-care deficits   - Assess range of motion  - Assess patient's mobility; develop plan if impaired  - Assess patient's need for assistive devices and provide as appropriate  - Encourage maximum independence but intervene and supervise when necessary  - Involve family in performance of ADLs  - Assess for home care needs following discharge   - Consider OT consult to assist with ADL evaluation and planning for discharge  - Provide patient education as appropriate  Outcome: Progressing  Goal: Maintains/Returns to pre admission functional level  Description: INTERVENTIONS:  - Perform BMAT or MOVE assessment daily    - Set and communicate daily mobility goal to care team and patient/family/caregiver  - Collaborate with rehabilitation services on mobility goals if consulted  - Perform Range of Motion  times a day  - Reposition patient every  hours    - Dangle patient  times a day  - Stand patient  times a day  - Ambulate patient  times a day  - Out of bed to chair  times a day   - Out of bed for meals  times a day  - Out of bed for toileting  - Record patient progress and toleration of activity level   Outcome: Progressing     Problem: DISCHARGE PLANNING  Goal: Discharge to home or other facility with appropriate resources  Description: INTERVENTIONS:  - Identify barriers to discharge w/patient and caregiver  - Arrange for needed discharge resources and transportation as appropriate  - Identify discharge learning needs (meds, wound care, etc )  - Arrange for interpretive services to assist at discharge as needed  - Refer to Case Management Department for coordinating discharge planning if the patient needs post-hospital services based on physician/advanced practitioner order or complex needs related to functional status, cognitive ability, or social support system  Outcome: Progressing     Problem: Knowledge Deficit  Goal: Patient/family/caregiver demonstrates understanding of disease process, treatment plan, medications, and discharge instructions  Description: Complete learning assessment and assess knowledge base    Interventions:  - Provide teaching at level of understanding  - Provide teaching via preferred learning methods  Outcome: Progressing     Problem: Potential for Falls  Goal: Patient will remain free of falls  Description: INTERVENTIONS:  - Educate patient/family on patient safety including physical limitations  - Instruct patient to call for assistance with activity   - Consult OT/PT to assist with strengthening/mobility   - Keep Call bell within reach  - Keep bed low and locked with side rails adjusted as appropriate  - Keep care items and personal belongings within reach  - Initiate and maintain comfort rounds  - Make Fall Risk Sign visible to staff  - Offer Toileting every  Hours, in advance of need  - Initiate/Maintain alarm  - Obtain necessary fall risk management equipment  - Apply yellow socks and bracelet for high fall risk patients  - Consider moving patient to room near nurses station  Outcome: Progressing     Problem: Prexisting or High Potential for Compromised Skin Integrity  Goal: Skin integrity is maintained or improved  Description: INTERVENTIONS:  - Identify patients at risk for skin breakdown  - Assess and monitor skin integrity  - Assess and monitor nutrition and hydration status  - Monitor labs   - Assess for incontinence   - Turn and reposition patient  - Assist with mobility/ambulation  - Relieve pressure over bony prominences  - Avoid friction and shearing  - Provide appropriate hygiene as needed including keeping skin clean and dry  - Evaluate need for skin moisturizer/barrier cream  - Collaborate with interdisciplinary team   - Patient/family teaching  - Consider wound care consult   Outcome: Progressing

## 2023-02-05 LAB
BACTERIA BLD CULT: NORMAL
BACTERIA BLD CULT: NORMAL

## 2023-04-01 PROBLEM — A41.9 SEPSIS (HCC): Status: RESOLVED | Noted: 2020-11-29 | Resolved: 2023-04-01

## 2023-08-21 ENCOUNTER — TELEPHONE (OUTPATIENT)
Age: 68
End: 2023-08-21

## 2023-08-21 NOTE — TELEPHONE ENCOUNTER
Pt's daughter Autumn Maillard called to request information on local senior centers in pt's area and transportation options for pt as well. LSW sent transportation options list, [facility] [employer], application for senior citizen transit ID card through [employer], and list of senior centers in [place] to Autumn Maillard at [email or web address] with LSW's contact information for any further questions. LSW to remain available as needed.

## 2023-09-06 ENCOUNTER — TELEPHONE (OUTPATIENT)
Age: 68
End: 2023-09-06

## 2023-09-06 NOTE — TELEPHONE ENCOUNTER
LSW received phone call from pt's daughter, Olivier Bustos. Olivier Bustos stated that she tried to obtain transportation for pt through [employer], and that they told her that pt's transport cost can be covered if she is enrolled in Waiver services. Olivier Bustos called to request information about how to get Waiver for pt. LSW informed Olivier Bustos that Waiver is administered by [facility] and is based on income/asset criteria. Lance expressed undertsanding. LSW sent the following information to [email or web address] at Karena Lauder request:      -Information on [employer] (Steps to Apply for [employer] with [employer] contact information and the Karly Howell and Support Pending Documentation checklist)   - OPTIONS Program information  - link to [facility] website ([email or web address])

## 2023-10-17 ENCOUNTER — HOSPITAL ENCOUNTER (INPATIENT)
Facility: HOSPITAL | Age: 68
LOS: 1 days | Discharge: HOME | DRG: 378 | End: 2023-10-18
Attending: EMERGENCY MEDICINE | Admitting: STUDENT IN AN ORGANIZED HEALTH CARE EDUCATION/TRAINING PROGRAM
Payer: MEDICARE

## 2023-10-17 ENCOUNTER — APPOINTMENT (INPATIENT)
Dept: GASTROENTEROLOGY | Facility: HOSPITAL | Age: 68
DRG: 378 | End: 2023-10-17
Attending: INTERNAL MEDICINE
Payer: MEDICARE

## 2023-10-17 ENCOUNTER — ANESTHESIA EVENT (INPATIENT)
Dept: GASTROENTEROLOGY | Facility: HOSPITAL | Age: 68
End: 2023-10-17
Payer: MEDICARE

## 2023-10-17 ENCOUNTER — ANESTHESIA (INPATIENT)
Dept: GASTROENTEROLOGY | Facility: HOSPITAL | Age: 68
End: 2023-10-17
Payer: MEDICARE

## 2023-10-17 DIAGNOSIS — K92.1 MELENA: ICD-10-CM

## 2023-10-17 DIAGNOSIS — D64.9 ANEMIA: Primary | ICD-10-CM

## 2023-10-17 LAB
2HR DELTA HS TROPONIN: <0 NG/L
4HR DELTA HS TROPONIN: <0 NG/L
ABO GROUP BLD: NORMAL
ALBUMIN SERPL BCP-MCNC: 4.8 G/DL (ref 3.5–5)
ALP SERPL-CCNC: 29 U/L (ref 34–104)
ALT SERPL W P-5'-P-CCNC: 15 U/L (ref 7–52)
ANION GAP SERPL CALCULATED.3IONS-SCNC: 5 MMOL/L
ANION GAP SERPL CALCULATED.3IONS-SCNC: 7 MMOL/L
APTT PPP: 29 SECONDS (ref 23–37)
AST SERPL W P-5'-P-CCNC: 42 U/L (ref 13–39)
BASOPHILS # BLD AUTO: 0 THOUSANDS/ÂΜL (ref 0–0.1)
BASOPHILS # BLD AUTO: 0.01 THOUSANDS/ÂΜL (ref 0–0.1)
BASOPHILS NFR BLD AUTO: 0 % (ref 0–1)
BASOPHILS NFR BLD AUTO: 0 % (ref 0–1)
BILIRUB SERPL-MCNC: 1.87 MG/DL (ref 0.2–1)
BLD GP AB SCN SERPL QL: NEGATIVE
BUN SERPL-MCNC: 12 MG/DL (ref 5–25)
BUN SERPL-MCNC: 14 MG/DL (ref 5–25)
CALCIUM SERPL-MCNC: 8.8 MG/DL (ref 8.4–10.2)
CALCIUM SERPL-MCNC: 9.2 MG/DL (ref 8.4–10.2)
CARDIAC TROPONIN I PNL SERPL HS: 2 NG/L
CARDIAC TROPONIN I PNL SERPL HS: <2 NG/L
CARDIAC TROPONIN I PNL SERPL HS: <2 NG/L
CHLORIDE SERPL-SCNC: 105 MMOL/L (ref 96–108)
CHLORIDE SERPL-SCNC: 109 MMOL/L (ref 96–108)
CO2 SERPL-SCNC: 26 MMOL/L (ref 21–32)
CO2 SERPL-SCNC: 26 MMOL/L (ref 21–32)
CREAT SERPL-MCNC: 0.88 MG/DL (ref 0.6–1.3)
CREAT SERPL-MCNC: 0.97 MG/DL (ref 0.6–1.3)
EOSINOPHIL # BLD AUTO: 0.1 THOUSAND/ÂΜL (ref 0–0.61)
EOSINOPHIL # BLD AUTO: 0.11 THOUSAND/ÂΜL (ref 0–0.61)
EOSINOPHIL NFR BLD AUTO: 4 % (ref 0–6)
EOSINOPHIL NFR BLD AUTO: 4 % (ref 0–6)
ERYTHROCYTE [DISTWIDTH] IN BLOOD BY AUTOMATED COUNT: 22.8 % (ref 11.6–15.1)
FERRITIN SERPL-MCNC: 111 NG/ML (ref 11–307)
GFR SERPL CREATININE-BSD FRML MDRD: 59 ML/MIN/1.73SQ M
GFR SERPL CREATININE-BSD FRML MDRD: 67 ML/MIN/1.73SQ M
GLUCOSE SERPL-MCNC: 103 MG/DL (ref 65–140)
GLUCOSE SERPL-MCNC: 135 MG/DL (ref 65–140)
GLUCOSE SERPL-MCNC: 138 MG/DL (ref 65–140)
HCT VFR BLD AUTO: 16.9 % (ref 34.8–46.1)
HCT VFR BLD AUTO: 22 % (ref 34.8–46.1)
HCT VFR BLD AUTO: 22.9 % (ref 34.8–46.1)
HGB BLD-MCNC: 5.8 G/DL (ref 11.5–15.4)
HGB BLD-MCNC: 7.5 G/DL (ref 11.5–15.4)
HGB BLD-MCNC: 8 G/DL (ref 11.5–15.4)
IMM GRANULOCYTES # BLD AUTO: 0 THOUSAND/UL (ref 0–0.2)
IMM GRANULOCYTES # BLD AUTO: 0.01 THOUSAND/UL (ref 0–0.2)
IMM GRANULOCYTES NFR BLD AUTO: 0 % (ref 0–2)
IMM GRANULOCYTES NFR BLD AUTO: 0 % (ref 0–2)
INR PPP: 1.09 (ref 0.84–1.19)
IRON SATN MFR SERPL: 70 % (ref 15–50)
IRON SERPL-MCNC: 174 UG/DL (ref 50–212)
LACTATE SERPL-SCNC: 1.7 MMOL/L (ref 0.5–2)
LYMPHOCYTES # BLD AUTO: 1.53 THOUSANDS/ÂΜL (ref 0.6–4.47)
LYMPHOCYTES # BLD AUTO: 1.9 THOUSANDS/ÂΜL (ref 0.6–4.47)
LYMPHOCYTES NFR BLD AUTO: 66 % (ref 14–44)
LYMPHOCYTES NFR BLD AUTO: 66 % (ref 14–44)
MCH RBC QN AUTO: 38.3 PG (ref 26.8–34.3)
MCH RBC QN AUTO: 42.3 PG (ref 26.8–34.3)
MCHC RBC AUTO-ENTMCNC: 34.1 G/DL (ref 31.4–37.4)
MCHC RBC AUTO-ENTMCNC: 34.3 G/DL (ref 31.4–37.4)
MCV RBC AUTO: 112 FL (ref 82–98)
MCV RBC AUTO: 123 FL (ref 82–98)
MONOCYTES # BLD AUTO: 0.05 THOUSAND/ÂΜL (ref 0.17–1.22)
MONOCYTES # BLD AUTO: 0.06 THOUSAND/ÂΜL (ref 0.17–1.22)
MONOCYTES NFR BLD AUTO: 2 % (ref 4–12)
MONOCYTES NFR BLD AUTO: 2 % (ref 4–12)
NEUTROPHILS # BLD AUTO: 0.66 THOUSANDS/ÂΜL (ref 1.85–7.62)
NEUTROPHILS # BLD AUTO: 0.83 THOUSANDS/ÂΜL (ref 1.85–7.62)
NEUTS SEG NFR BLD AUTO: 28 % (ref 43–75)
NEUTS SEG NFR BLD AUTO: 28 % (ref 43–75)
NRBC BLD AUTO-RTO: 1 /100 WBCS
NRBC BLD AUTO-RTO: 1 /100 WBCS
PLATELET # BLD AUTO: 146 THOUSANDS/UL (ref 149–390)
PLATELET # BLD AUTO: 185 THOUSANDS/UL (ref 149–390)
PMV BLD AUTO: 11.8 FL (ref 8.9–12.7)
PMV BLD AUTO: 12.1 FL (ref 8.9–12.7)
POTASSIUM SERPL-SCNC: 4 MMOL/L (ref 3.5–5.3)
POTASSIUM SERPL-SCNC: 4.2 MMOL/L (ref 3.5–5.3)
PROT SERPL-MCNC: 7.5 G/DL (ref 6.4–8.4)
PROTHROMBIN TIME: 14.7 SECONDS (ref 11.6–14.5)
RBC # BLD AUTO: 1.37 MILLION/UL (ref 3.81–5.12)
RBC # BLD AUTO: 1.96 MILLION/UL (ref 3.81–5.12)
RH BLD: POSITIVE
SODIUM SERPL-SCNC: 138 MMOL/L (ref 135–147)
SODIUM SERPL-SCNC: 140 MMOL/L (ref 135–147)
SPECIMEN EXPIRATION DATE: NORMAL
TIBC SERPL-MCNC: 247 UG/DL (ref 250–450)
UIBC SERPL-MCNC: 73 UG/DL (ref 155–355)
WBC # BLD AUTO: 2.34 THOUSAND/UL (ref 4.31–10.16)
WBC # BLD AUTO: 2.92 THOUSAND/UL (ref 4.31–10.16)

## 2023-10-17 PROCEDURE — 85730 THROMBOPLASTIN TIME PARTIAL: CPT

## 2023-10-17 PROCEDURE — 82948 REAGENT STRIP/BLOOD GLUCOSE: CPT

## 2023-10-17 PROCEDURE — 80053 COMPREHEN METABOLIC PANEL: CPT

## 2023-10-17 PROCEDURE — 86901 BLOOD TYPING SEROLOGIC RH(D): CPT

## 2023-10-17 PROCEDURE — 99223 1ST HOSP IP/OBS HIGH 75: CPT | Performed by: STUDENT IN AN ORGANIZED HEALTH CARE EDUCATION/TRAINING PROGRAM

## 2023-10-17 PROCEDURE — 36415 COLL VENOUS BLD VENIPUNCTURE: CPT

## 2023-10-17 PROCEDURE — 86850 RBC ANTIBODY SCREEN: CPT

## 2023-10-17 PROCEDURE — P9016 RBC LEUKOCYTES REDUCED: HCPCS

## 2023-10-17 PROCEDURE — 88305 TISSUE EXAM BY PATHOLOGIST: CPT | Performed by: SPECIALIST

## 2023-10-17 PROCEDURE — 80048 BASIC METABOLIC PNL TOTAL CA: CPT

## 2023-10-17 PROCEDURE — 85610 PROTHROMBIN TIME: CPT

## 2023-10-17 PROCEDURE — 30233N1 TRANSFUSION OF NONAUTOLOGOUS RED BLOOD CELLS INTO PERIPHERAL VEIN, PERCUTANEOUS APPROACH: ICD-10-PCS | Performed by: STUDENT IN AN ORGANIZED HEALTH CARE EDUCATION/TRAINING PROGRAM

## 2023-10-17 PROCEDURE — 99283 EMERGENCY DEPT VISIT LOW MDM: CPT

## 2023-10-17 PROCEDURE — 83550 IRON BINDING TEST: CPT

## 2023-10-17 PROCEDURE — 0DB98ZX EXCISION OF DUODENUM, VIA NATURAL OR ARTIFICIAL OPENING ENDOSCOPIC, DIAGNOSTIC: ICD-10-PCS | Performed by: INTERNAL MEDICINE

## 2023-10-17 PROCEDURE — 82728 ASSAY OF FERRITIN: CPT

## 2023-10-17 PROCEDURE — 99285 EMERGENCY DEPT VISIT HI MDM: CPT

## 2023-10-17 PROCEDURE — 85025 COMPLETE CBC W/AUTO DIFF WBC: CPT

## 2023-10-17 PROCEDURE — 85014 HEMATOCRIT: CPT

## 2023-10-17 PROCEDURE — 84484 ASSAY OF TROPONIN QUANT: CPT

## 2023-10-17 PROCEDURE — 83605 ASSAY OF LACTIC ACID: CPT

## 2023-10-17 PROCEDURE — 86923 COMPATIBILITY TEST ELECTRIC: CPT

## 2023-10-17 PROCEDURE — 85018 HEMOGLOBIN: CPT

## 2023-10-17 PROCEDURE — C9113 INJ PANTOPRAZOLE SODIUM, VIA: HCPCS

## 2023-10-17 PROCEDURE — 86900 BLOOD TYPING SEROLOGIC ABO: CPT

## 2023-10-17 PROCEDURE — 99223 1ST HOSP IP/OBS HIGH 75: CPT

## 2023-10-17 PROCEDURE — 0DB68ZX EXCISION OF STOMACH, VIA NATURAL OR ARTIFICIAL OPENING ENDOSCOPIC, DIAGNOSTIC: ICD-10-PCS | Performed by: INTERNAL MEDICINE

## 2023-10-17 PROCEDURE — 83540 ASSAY OF IRON: CPT

## 2023-10-17 RX ORDER — PRAVASTATIN SODIUM 80 MG/1
80 TABLET ORAL
Status: DISCONTINUED | OUTPATIENT
Start: 2023-10-17 | End: 2023-10-18 | Stop reason: HOSPADM

## 2023-10-17 RX ORDER — LIDOCAINE HYDROCHLORIDE 20 MG/ML
INJECTION, SOLUTION EPIDURAL; INFILTRATION; INTRACAUDAL; PERINEURAL AS NEEDED
Status: DISCONTINUED | OUTPATIENT
Start: 2023-10-17 | End: 2023-10-17

## 2023-10-17 RX ORDER — POLYETHYLENE GLYCOL 3350 17 G/17G
238 POWDER, FOR SOLUTION ORAL ONCE
Status: COMPLETED | OUTPATIENT
Start: 2023-10-17 | End: 2023-10-17

## 2023-10-17 RX ORDER — PROPOFOL 10 MG/ML
INJECTION, EMULSION INTRAVENOUS AS NEEDED
Status: DISCONTINUED | OUTPATIENT
Start: 2023-10-17 | End: 2023-10-17

## 2023-10-17 RX ORDER — ACETAMINOPHEN 325 MG/1
650 TABLET ORAL EVERY 6 HOURS PRN
Status: DISCONTINUED | OUTPATIENT
Start: 2023-10-17 | End: 2023-10-18 | Stop reason: HOSPADM

## 2023-10-17 RX ORDER — SODIUM CHLORIDE, SODIUM LACTATE, POTASSIUM CHLORIDE, CALCIUM CHLORIDE 600; 310; 30; 20 MG/100ML; MG/100ML; MG/100ML; MG/100ML
INJECTION, SOLUTION INTRAVENOUS CONTINUOUS PRN
Status: DISCONTINUED | OUTPATIENT
Start: 2023-10-17 | End: 2023-10-17

## 2023-10-17 RX ORDER — SODIUM CHLORIDE, SODIUM LACTATE, POTASSIUM CHLORIDE, CALCIUM CHLORIDE 600; 310; 30; 20 MG/100ML; MG/100ML; MG/100ML; MG/100ML
50 INJECTION, SOLUTION INTRAVENOUS CONTINUOUS
Status: CANCELLED | OUTPATIENT
Start: 2023-10-17

## 2023-10-17 RX ORDER — PANTOPRAZOLE SODIUM 40 MG/10ML
40 INJECTION, POWDER, LYOPHILIZED, FOR SOLUTION INTRAVENOUS EVERY 12 HOURS SCHEDULED
Status: DISCONTINUED | OUTPATIENT
Start: 2023-10-17 | End: 2023-10-17

## 2023-10-17 RX ADMIN — PANTOPRAZOLE SODIUM 40 MG: 40 INJECTION, POWDER, FOR SOLUTION INTRAVENOUS at 04:17

## 2023-10-17 RX ADMIN — ACETAMINOPHEN 650 MG: 325 TABLET, FILM COATED ORAL at 22:23

## 2023-10-17 RX ADMIN — SODIUM CHLORIDE, SODIUM LACTATE, POTASSIUM CHLORIDE, AND CALCIUM CHLORIDE: .6; .31; .03; .02 INJECTION, SOLUTION INTRAVENOUS at 11:11

## 2023-10-17 RX ADMIN — POLYETHYLENE GLYCOL 3350 238 G: 17 POWDER, FOR SOLUTION ORAL at 16:45

## 2023-10-17 RX ADMIN — LIDOCAINE HYDROCHLORIDE 60 MG: 20 INJECTION, SOLUTION EPIDURAL; INFILTRATION; INTRACAUDAL; PERINEURAL at 13:44

## 2023-10-17 RX ADMIN — PROPOFOL 120 MG: 10 INJECTION, EMULSION INTRAVENOUS at 13:44

## 2023-10-17 RX ADMIN — PRAVASTATIN SODIUM 80 MG: 80 TABLET ORAL at 17:47

## 2023-10-17 NOTE — ANESTHESIA POSTPROCEDURE EVALUATION
Post-Op Assessment Note    CV Status:  Stable  Pain Score: 0    Pain management: adequate     Mental Status:  Alert and awake   Hydration Status:  Stable   PONV Controlled:  None   Airway Patency:  Patent      Post Op Vitals Reviewed: Yes      Staff: CRNA         No notable events documented.     BP  159/82   Temp      Pulse  108   Resp   16   SpO2   99

## 2023-10-17 NOTE — ED PROVIDER NOTES
History  Chief Complaint   Patient presents with    Abnormal Lab     Patient sent by PCP for low hemoglobin. Patient reports family provider called her around 2am and told patient to go to ER for transfusion. Patient denies any symptoms at this time. This is a 79-year-old female with past medical history of anemia, diabetes, presenting for an abnormal lab. States that she went to a clinic today for a routine blood draw. She states that she received a call from her doctor around 2 AM to go to the emergency room for a blood transfusion due to a hemoglobin of 5. Patient states that she has had diarrhea for 2 week that looks dark to her. Patient denies any syncope, vomiting, hematemesis, hematochezia, BRBPR, hemoptysis, shortness of breath, chest pain, palpitations, fever, chills, night sweats, weakness, fatigue, confusion, falls, trauma. Patient denies blood thinner use. History provided by:  Patient and relative   used: No        Prior to Admission Medications   Prescriptions Last Dose Informant Patient Reported? Taking?   hydrOXYzine HCL (ATARAX) 25 mg tablet   No No   Sig: Take 1 tablet (25 mg total) by mouth every 6 (six) hours As needed for itching   levETIRAcetam (KEPPRA) 500 mg tablet   No No   Sig: Take 1 tablet (500 mg total) by mouth every 12 (twelve) hours   metFORMIN (GLUCOPHAGE-XR) 500 mg 24 hr tablet   Yes No   Sig: TAKE 4 TABLETS ONCE DAILY   pioglitazone (ACTOS) 30 mg tablet   Yes No   Sig: Take 30 mg by mouth daily   simvastatin (ZOCOR) 40 mg tablet   Yes No   Sig: TAKE 1 TABLET NIGHTLY      Facility-Administered Medications: None       Past Medical History:   Diagnosis Date    Anemia     Diabetes mellitus ([address])        History reviewed. No pertinent surgical history. History reviewed. No pertinent family history. I have reviewed and agree with the history as documented.     E-Cigarette/Vaping    E-Cigarette Use Never User      E-Cigarette/Vaping Substances Social History     Tobacco Use    Smoking status: Never    Smokeless tobacco: Never   Vaping Use    Vaping Use: Never used   Substance Use Topics    Alcohol use: Never    Drug use: Never       Review of Systems   Constitutional:  Negative for chills and fatigue. HENT:  Negative for ear discharge, ear pain, nosebleeds and tinnitus. Respiratory:  Negative for cough, chest tightness, shortness of breath and wheezing. Cardiovascular:  Negative for chest pain, palpitations and leg swelling. Gastrointestinal:  Positive for diarrhea. Negative for abdominal pain, constipation, nausea, rectal pain and vomiting. Genitourinary:  Negative for dysuria and hematuria. Musculoskeletal:  Negative for back pain, myalgias, neck pain and neck stiffness. Skin:  Negative for color change, pallor and wound. Neurological:  Negative for dizziness, syncope, speech difficulty, weakness, light-headedness, numbness and headaches. Psychiatric/Behavioral:  Negative for agitation and confusion. Physical Exam  Physical Exam  Vitals reviewed. Constitutional:       Appearance: Normal appearance. HENT:      Head: Normocephalic and atraumatic. Mouth/Throat:      Pharynx: Oropharynx is clear. Cardiovascular:      Rate and Rhythm: Tachycardia present. Pulmonary:      Effort: Pulmonary effort is normal.      Breath sounds: Normal breath sounds. Skin:     General: Skin is warm and dry. Neurological:      Mental Status: She is alert and oriented to person, place, and time.          Vital Signs  ED Triage Vitals   Temperature Pulse Respirations Blood Pressure SpO2   10/17/23 0300 10/17/23 0300 10/17/23 0300 10/17/23 0300 10/17/23 0300   97.7 °F (36.5 °C) 104 18 155/70 100 %      Temp Source Heart Rate Source Patient Position - Orthostatic VS BP Location FiO2 (%)   10/17/23 0445 10/17/23 0330 10/17/23 0330 10/17/23 0330 --   Oral Monitor Sitting Right arm       Pain Score       10/17/23 0300       No Pain Vitals:    10/17/23 0300 10/17/23 0330 10/17/23 0437 10/17/23 0445   BP: 155/70 152/69 134/63 143/63   Pulse: 104 91 91 88   Patient Position - Orthostatic VS:  Sitting           Visual Acuity      ED Medications  Medications   pravastatin (PRAVACHOL) tablet 80 mg (has no administration in time range)   pantoprazole (PROTONIX) injection 40 mg (40 mg Intravenous Given 10/17/23 0417)       Diagnostic Studies  Results Reviewed       Procedure Component Value Units Date/Time    TIBC Panel (incl.  Iron, TIBC, % Iron Saturation) [[id number]]     Lab Status: No result Specimen: Blood     Ferritin [[id number]]     Lab Status: No result Specimen: Blood     CBC and differential [[id number]]     Lab Status: No result Specimen: Blood     Basic metabolic panel [[id number]]     Lab Status: No result Specimen: Blood     HS Troponin I 4hr [[id number]]     Lab Status: No result Specimen: Blood     HS Troponin I 2hr [[id number]]     Lab Status: No result Specimen: Blood     HS Troponin 0hr (reflex protocol) [[id number]]  (Normal) Collected: 10/17/23 0319    Lab Status: Final result Specimen: Blood from Arm, Right Updated: 10/17/23 0356     hs TnI 0hr 2 ng/L     CBC and differential [[id number]]  (Abnormal) Collected: 10/17/23 0319    Lab Status: Final result Specimen: Blood from Arm, Right Updated: 10/17/23 0347     WBC 2.92 Thousand/uL      RBC 1.37 Million/uL      Hemoglobin 5.8 g/dL      Hematocrit 16.9 %       fL      MCH 42.3 pg      MCHC 34.3 g/dL      RDW 22.8 %      MPV 12.1 fL      Platelets 580 Thousands/uL      nRBC 1 /100 WBCs      Neutrophils Relative 28 %      Immat GRANS % 0 %      Lymphocytes Relative 66 %      Monocytes Relative 2 %      Eosinophils Relative 4 %      Basophils Relative 0 %      Neutrophils Absolute 0.83 Thousands/µL      Immature Grans Absolute 0.01 Thousand/uL      Lymphocytes Absolute 1.90 Thousands/µL      Monocytes Absolute 0.06 Thousand/µL      Eosinophils Absolute 0.11 Thousand/µL Basophils Absolute 0.01 Thousands/µL     Narrative: This is an appended report. These results have been appended to a previously verified report. Protime-INR [[id number]]  (Abnormal) Collected: 10/17/23 0319    Lab Status: Final result Specimen: Blood from Arm, Right Updated: 10/17/23 0346     Protime 14.7 seconds      INR 1.09    APTT [[id number]]  (Normal) Collected: 10/17/23 0319    Lab Status: Final result Specimen: Blood from Arm, Right Updated: 10/17/23 0346     PTT 29 seconds     Comprehensive metabolic panel [[id number]]  (Abnormal) Collected: 10/17/23 0319    Lab Status: Final result Specimen: Blood from Arm, Right Updated: 10/17/23 0344     Sodium 138 mmol/L      Potassium 4.0 mmol/L      Chloride 105 mmol/L      CO2 26 mmol/L      ANION GAP 7 mmol/L      BUN 14 mg/dL      Creatinine 0.97 mg/dL      Glucose 135 mg/dL      Calcium 9.2 mg/dL      AST 42 U/L      ALT 15 U/L      Alkaline Phosphatase 29 U/L      Total Protein 7.5 g/dL      Albumin 4.8 g/dL      Total Bilirubin 1.87 mg/dL      eGFR 59 ml/min/1.73sq m     Narrative:      [place] guidelines for Chronic Kidney Disease (CKD):     Stage 1 with normal or high GFR (GFR > 90 mL/min/1.73 square meters)    Stage 2 Mild CKD (GFR = 60-89 mL/min/1.73 square meters)    Stage 3A Moderate CKD (GFR = 45-59 mL/min/1.73 square meters)    Stage 3B Moderate CKD (GFR = 30-44 mL/min/1.73 square meters)    Stage 4 Severe CKD (GFR = 15-29 mL/min/1.73 square meters)    Stage 5 End Stage CKD (GFR <15 mL/min/1.73 square meters)  Note: GFR calculation is accurate only with a steady state creatinine    Lactic acid, plasma (w/reflex if result > 2.0) [[id number]]  (Normal) Collected: 10/17/23 0319    Lab Status: Final result Specimen: Blood from Arm, Right Updated: 10/17/23 0343     LACTIC ACID 1.7 mmol/L     Narrative:      Result may be elevated if tourniquet was used during collection.                    No orders to display Procedures  Procedures         ED Course  ED Course as of 10/17/23 0453   Tue Oct 17, 2023   0344 BUN: 14   0345 AST(!): 42   0345 ALT: 15   0345 LACTIC ACID: 1.7   0347 Hemoglobin(!!): 5.8   0347 PROTIME(!): 14.7   0357 Red Blood Cell Count(!): 1.37   0359 HCT(!): 16.9   0400 MCV(!): 123   0400 MCH(!): 42.3                               SBIRT 22yo+      Flowsheet Row Most Recent Value   Initial Alcohol Screen: US AUDIT-C     1. How often do you have a drink containing alcohol? 0 Filed at: 10/17/2023 0300   2. How many drinks containing alcohol do you have on a typical day you are drinking? 0 Filed at: 10/17/2023 0300   3a. Male UNDER 65: How often do you have five or more drinks on one occasion? 0 Filed at: 10/17/2023 0300   3b. FEMALE Any Age, or MALE 65+: How often do you have 4 or more drinks on one occassion? 0 Filed at: 10/17/2023 0300   Audit-C Score 0 Filed at: 10/17/2023 0300   [place]: How many times in the past year have you. .. Used an illegal drug or used a prescription medication for non-medical reasons? Never Filed at: 10/17/2023 0300                      Medical Decision Making  This is a 69-year-old female with past medical history of anemia, diabetes, presenting for an abnormal lab.      Patient is in no acute distress in the stretcher only complaining of mild fatigue and tiredness  Patient was seen in Jan for a hemoglobin of 6, but was also septic from Covid  Patient denies every having a colonoscopy done  Patient complaining of dark diarrhea for about 2 weeks    Ddx includes but is not limited to Upper GI bleed, Lower GI bleed, iron deficiency anemia     ED Course:  - Cannot see the hemoglobin level due to it being done at a [facility] facility   -  CBC, CMP, trop, PT/INR/Ptt, lactic acid, type and screen ordered  - BUN 14, electrolytes wnl  - Hemoglobin 5.8  - Consented patient for blood discussing risks and benefits and discussed staying in the hospital for further workup of anemia   - Patient seems mildly confused however daughter is bedside and states she is at baseline  - Discussed with Abisai Brooks PA-C and will admit patient in-patient under Dr. Ying Hinds    Problems Addressed:  Anemia: acute illness or injury    Amount and/or Complexity of Data Reviewed  Labs: ordered. Decision-making details documented in ED Course. Risk  Decision regarding hospitalization. Disposition  Final diagnoses:   Anemia     Time reflects when diagnosis was documented in both MDM as applicable and the Disposition within this note       Time User Action Codes Description Comment    10/17/2023  4:08 AM Chris Barrientos [D64.9] Anemia     10/17/2023  4:15 AM Abisai Martinez [K92.1] Melena           ED Disposition       ED Disposition   Admit    Condition   Stable    Date/Time   Tue Oct 17, 2023 0408    Comment   Case was discussed with Abisai Brooks and the patient's admission status was agreed to be Admission Status: inpatient status to the service of Dr. Ying Hinds . Follow-up Information    None         Patient's Medications   Discharge Prescriptions    No medications on file       No discharge procedures on file.     PDMP Review       None            ED Provider  Electronically Signed by             Evelyn Tai PA-C  10/17/23 3078

## 2023-10-17 NOTE — ASSESSMENT & PLAN NOTE
Patient reports experiencing dark diarrhea over the past week. She had outpatient lab work performed yesterday, and reports that her PCP asked that she present to the ED due to an abnormality with her lab work. Otherwise she currently she denies acute symptoms or complaint.     /69 (BP Location: Right arm)   Pulse 91   Temp 97.7 °F (36.5 °C)   Resp 18   SpO2 100%     Reporting dark diarrhea over the past week  Not currently on outpatient AC  HGB 5.8 on ED presentation  Abd non-peritoneal, no guarding, non-distended on admission  1U PRBC infusion initiated in ED (not yet initiated on admission; awaiting blood)   Discussed with ED provider; plan to admit for further GI work-up and blood transfusion  q6H HGB monitoring  Transfuse to maintain HGB >7  Made NPO  GI consulted; recommendations appreciated

## 2023-10-17 NOTE — ASSESSMENT & PLAN NOTE
-Continue NPO  -Plan for EGD and possibly colonoscopy   -Plan for CTA if EGD and colonoscopy are unable to determine bleeding source.   -Iron studies pending  -Obtain folate & B12 levels to determine etiology of macrocytic anemia  -TSH & T4 levels if folate & B12 levels are normal, in the setting of macrocytic anemia  -Continue to monitor hemoglobin level post pRBC transfusion

## 2023-10-17 NOTE — PROGRESS NOTES
[facility]  Progress Note  Name: Keisha Ruiz  MRN: [id number]  Unit/Bed#: ED 25 I Date of Admission: 10/17/2023   Date of Service: 10/17/2023 I Hospital Day: 0    Assessment/Plan   * Melena  Assessment & Plan  -Continue NPO  -Plan for EGD and possibly colonoscopy   -Plan for CTA if EGD and colonoscopy are unable to determine bleeding source. -Iron studies pending  -Obtain folate & B12 levels to determine etiology of macrocytic anemia  -TSH & T4 levels if folate & B12 levels are normal, in the setting of macrocytic anemia  -Continue to monitor hemoglobin level post pRBC transfusion    DM type 2 (diabetes mellitus, type 2) ([facility])  Assessment & Plan  Lab Results   Component Value Date    HGBA1C 7.3 (H) 10/10/2023     -Pt currently NPO. Will discontinue metformin and actos for now. Seizure-like activity ([address])  Assessment & Plan  Pt reports she is no longer taking keppra. Hyperlipidemia associated with type 2 diabetes mellitus   Assessment & Plan  Lab Results   Component Value Date    HGBA1C 7.3 (H) 10/10/2023     Continue home statin    VTE Pharmacologic Prophylaxis: VTE Score: 3 Moderate Risk (Score 3-4) - Pharmacological DVT Prophylaxis Ordered    Education and Discussions with Family / Patient: Updated  (daughter) at bedside. Total Time Spent on Date of Encounter in care of patient: 40 mins. This time was spent on one or more of the following: performing physical exam; counseling and coordination of care; obtaining or reviewing history; documenting in the medical record; reviewing/ordering tests, medications or procedures; communicating with other healthcare professionals and discussing with patient's family/caregivers. Current Length of Stay: 0 day(s)  Current Patient Status: Inpatient     Code Status: Level 1 - Full Code    Subjective:   Valerie Noble is a 70 yr old female with a PMH of anemia and diabetes admitted for a blood transfusion secondary to a hgb of 5. Pt reports no nausea, vomiting, shortness of breath, chest pain or palpitations. Pt has had no bowel movements but is voiding and reports no blood in her urine. Pt endorses epigastric tenderness but is otherwise doing well. Objective:     Vitals:   Temp (24hrs), Av.3 °F (36.8 °C), Min:97.7 °F (36.5 °C), Max:98.8 °F (37.1 °C)    Temp:  [97.7 °F (36.5 °C)-98.8 °F (37.1 °C)] 98.4 °F (36.9 °C)  HR:  [] 86  Resp:  [16-18] 16  BP: (129-162)/(63-82) 162/76  SpO2:  [98 %-100 %] 99 %  Body mass index is 27.63 kg/m². Input and Output Summary (last 24 hours):   No intake or output data in the 24 hours ending 10/17/23 1127    Physical Exam:   Physical Exam  Constitutional:       General: She is not in acute distress. HENT:      Head: Normocephalic and atraumatic. Nose: Nose normal. No congestion. Mouth/Throat:      Mouth: Mucous membranes are moist.   Eyes:      General: No scleral icterus. Pupils: Pupils are equal, round, and reactive to light. Comments: Conjunctiva pale   Cardiovascular:      Rate and Rhythm: Normal rate and regular rhythm. Heart sounds: Normal heart sounds. Pulmonary:      Breath sounds: Normal breath sounds. Abdominal:      General: Bowel sounds are normal. There is no distension. Tenderness: There is abdominal tenderness. Comments: Mild epigastric tenderness on palpation   Musculoskeletal:      Cervical back: Normal range of motion. Neurological:      Mental Status: She is alert.           Additional Data:     Labs:  Results from last 7 days   Lab Units 10/17/23  0743   WBC Thousand/uL 2.34*   HEMOGLOBIN g/dL 7.5*   HEMATOCRIT % 22.0*   PLATELETS Thousands/uL 146*   NEUTROS PCT % 28*   LYMPHS PCT % 66*   MONOS PCT % 2*   EOS PCT % 4     Results from last 7 days   Lab Units 10/17/23  0743 10/17/23  0319   SODIUM mmol/L 140 138   POTASSIUM mmol/L 4.2 4.0   CHLORIDE mmol/L 109* 105   CO2 mmol/L 26 26   BUN mg/dL 12 14   CREATININE mg/dL 0.88 0.97 ANION GAP mmol/L 5 7   CALCIUM mg/dL 8.8 9.2   ALBUMIN g/dL  --  4.8   TOTAL BILIRUBIN mg/dL  --  1.87*   ALK PHOS U/L  --  29*   ALT U/L  --  15   AST U/L  --  42*   GLUCOSE RANDOM mg/dL 103 135     Results from last 7 days   Lab Units 10/17/23  0319   INR  1.09             Results from last 7 days   Lab Units 10/17/23  0319   LACTIC ACID mmol/L 1.7       Lines/Drains:  Invasive Devices       Peripheral Intravenous Line  Duration             Peripheral IV 10/17/23 Left Arm <1 day    Peripheral IV 10/17/23 Right Hand <1 day                      Recent Cultures (last 7 days):         Last 24 Hours Medication List:   Current Facility-Administered Medications   Medication Dose Route Frequency Provider Last Rate    pantoprazole  40 mg Intravenous Q12H FAUSTINO Michaels      pravastatin  80 mg Oral Daily With [address] PA-C          Today, Patient Was Seen By: Barbie Riddle    **Please Note: This note may have been constructed using a voice recognition system. **

## 2023-10-17 NOTE — ANESTHESIA PREPROCEDURE EVALUATION
Procedure:  EGD    Relevant Problems   ANESTHESIA (within normal limits)      CARDIO   (+) Hyperlipidemia associated with type 2 diabetes mellitus       ENDO   (+) DM type 2 (diabetes mellitus, type 2) (HCC)      HEMATOLOGY   (+) Pancytopenia (HCC)   -NPO since last night, no reported vomiting/hematemesis       Allergies   Allergen Reactions    Penicillin G      Other reaction(s): Dizzy     Social History     Tobacco Use    Smoking status: Never    Smokeless tobacco: Never   Vaping Use    Vaping Use: Never used   Substance Use Topics    Alcohol use: Never    Drug use: Never     Current Outpatient Medications   Medication Instructions    hydrOXYzine HCL (ATARAX) 25 mg, Oral, Every 6 hours, As needed for itching    levETIRAcetam (KEPPRA) 500 mg, Oral, Every 12 hours scheduled    metFORMIN (GLUCOPHAGE-XR) 500 mg 24 hr tablet TAKE 4 TABLETS ONCE DAILY    pioglitazone (ACTOS) 30 mg, Oral, Daily    simvastatin (ZOCOR) 40 mg tablet TAKE 1 TABLET NIGHTLY     Lab Results   Component Value Date    WBC 2.34 (L) 10/17/2023    HGB 7.5 (L) 10/17/2023    HCT 22.0 (L) 10/17/2023     (L) 10/17/2023    SODIUM 140 10/17/2023    K 4.2 10/17/2023     (H) 10/17/2023    CO2 26 10/17/2023    BUN 12 10/17/2023    CREATININE 0.88 10/17/2023    GLUC 103 10/17/2023    HGBA1C 7.3 (H) 10/10/2023    AST 42 (H) 10/17/2023    ALT 15 10/17/2023    ALKPHOS 29 (L) 10/17/2023    TBILI 1.87 (H) 10/17/2023    ALB 4.8 10/17/2023    PROTIME 14.7 (H) 10/17/2023    PTT 29 10/17/2023    INR 1.09 10/17/2023     Vitals:    10/17/23 0900   BP: 162/76   Pulse: 86   Resp: 16   Temp:    SpO2: 99%     EKG 1/30/23  Sinus tachycardia  Otherwise normal ECG  When compared with ECG of 22-AUG-2022 22:07,  Nonspecific T wave abnormality no longer evident in Anterior leads  Confirmed by Robert Tena ([id number]) on 1/31/2023 8:58:08 AM    Physical Exam    Airway    Mallampati score: III  TM Distance: >3 FB  Neck ROM: full     Dental   Comment: Denies loose/chipped teeth, No notable dental hx     Cardiovascular  Rhythm: regular, Rate: normal, Cardiovascular exam normal    Pulmonary  Pulmonary exam normal Breath sounds clear to auscultation    Other Findings        Anesthesia Plan  ASA Score- 3     Anesthesia Type- IV sedation with anesthesia with ASA Monitors. Additional Monitors:     Airway Plan:     Comment: O2 mask, natural airway, EtCO2 monitor. Risks discussed including awareness, aspiration, drug reactions and conversion to GA. Abdirahman Gaspar Plan Factors-Exercise tolerance (METS): >4 METS. Chart reviewed. EKG reviewed. Existing labs reviewed. Patient summary reviewed. Patient is not a current smoker. Induction- intravenous. Postoperative Plan-     Informed Consent- Anesthetic plan and risks discussed with patient. I personally reviewed this patient with the CRNA. Discussed and agreed on the Anesthesia Plan with the CRNA. .            Medication Administration - last 24 hours from 10/16/2023 1231 to 10/17/2023 1231         Date/Time Order Dose Route Action Action by     10/17/2023 0417 EDT pantoprazole (PROTONIX) injection 40 mg 40 mg Intravenous Given Brooks Lozano RN

## 2023-10-17 NOTE — ASSESSMENT & PLAN NOTE
Lab Results   Component Value Date    HGBA1C 7.3 (H) 10/10/2023     Hold home oral antihyperglycemics   q6H POCT BG checks while NPO  Hypoglycemia protocol

## 2023-10-17 NOTE — CONSULTS
Consultation - [address] [place] [address] [facility]  Ruchi Soto 70 y.o. female MRN: [id number]  Unit/Bed#: TANNER Encounter: [id number]      Inpatient consult to gastroenterology  Consult performed by: Akanksha Myers PA-C  Consult ordered by: Kip Poole PA-C         Reason for Consult / Principal Problem: Anemia, melena    HPI: Ruchi Soto is a 70y.o. year old female with a PMH of anemia, DM, hyperlipidemia who presented to the ED due to a severe anemia noted on outpatient labs. Patient was found to have a hemoglobin of 5.8. Patient was transfused 2 units of PRBCs with improvement to 7.5. She reports dark soft stools over the past week but no red rectal bleeding. No abdominal pain. She reports vomiting a couple of weeks ago which resolved. No frequent NSAIDs. No prior EGD or colonoscopy. No family history of esophageal, stomach, or colon cancer. REVIEW OF SYSTEMS:     CONSTITUTIONAL: Denies any fever, chills, or rigors. HEENT: No earache or tinnitus. Denies hearing loss or visual disturbances. CARDIOVASCULAR: No chest pain or palpitations. RESPIRATORY: Denies any cough, hemoptysis, shortness of breath or dyspnea on exertion. GASTROINTESTINAL: As noted in the History of Present Illness. GENITOURINARY: No problems with urination. Denies any hematuria or dysuria. NEUROLOGIC: No dizziness or vertigo, denies headaches. MUSCULOSKELETAL: Denies any muscle or joint pain. SKIN: Denies skin rashes or itching. ENDOCRINE: Denies excessive thirst. Denies intolerance to heat or cold. PSYCHOSOCIAL: Denies depression or anxiety. Denies any recent memory loss. Historical Information   Past Medical History:   Diagnosis Date    Anemia     Diabetes mellitus ([address])      History reviewed. No pertinent surgical history.   Social History   Social History     Substance and Sexual Activity   Alcohol Use Never     Social History     Substance and Sexual Activity   Drug Use Never     Social History Tobacco Use   Smoking Status Never   Smokeless Tobacco Never     History reviewed. No pertinent family history. Meds/Allergies     (Not in a hospital admission)    Current Facility-Administered Medications   Medication Dose Route Frequency    pantoprazole (PROTONIX) injection 40 mg  40 mg Intravenous Q12H [facility]    pravastatin (PRAVACHOL) tablet 80 mg  80 mg Oral Daily With Dinner     Facility-Administered Medications Ordered in Other Encounters   Medication Dose Route Frequency    lactated ringers infusion   Intravenous Continuous PRN       Allergies   Allergen Reactions    Penicillin G      Other reaction(s): Dizzy       Objective   Blood pressure (!) 169/111, pulse 87, temperature 97.5 °F (36.4 °C), temperature source Temporal, resp. rate 19, height 5' 3" (1.6 m), SpO2 100 %. No intake or output data in the 24 hours ending 10/17/23 1302      PHYSICAL EXAM:      General Appearance:   Alert, cooperative, no distress, appears stated age    HEENT:   Normocephalic, atraumatic, anicteric    Neck:  Supple, symmetrical, trachea midline, no adenopathy;    thyroid: no enlargement/tenderness/nodules; no carotid  bruit or JVD    Lungs:   Clear to auscultation bilaterally; no rales, rhonchi or wheezing; respirations unlabored    Heart[de-identified]   S1 and S2 normal; regular rate and rhythm; no murmur, rub, or gallop.    Abdomen:   Soft, non-tender, non-distended; normal bowel sounds; no masses, no organomegaly    Genitalia:   Deferred    Rectal:   Deferred    Extremities:  No cyanosis, clubbing or edema    Pulses:  2+ and symmetric all extremities    Skin:  Skin color, texture, turgor normal, no rashes or lesions    Lymph nodes:  No palpable cervical, axillary or inguinal lymphadenopathy        Lab Results:   Admission on 10/17/2023   Component Date Value    WBC 10/17/2023 2.92 (L)     RBC 10/17/2023 1.37 (L)     Hemoglobin 10/17/2023 5.8 (LL)     Hematocrit 10/17/2023 16.9 (L)     MCV 10/17/2023 123 (H)     MCH 10/17/2023 42.3 (H) MCHC 10/17/2023 34.3     RDW 10/17/2023 22.8 (H)     MPV 10/17/2023 12.1     Platelets 00/84/8830 185     nRBC 10/17/2023 1     Neutrophils Relative 10/17/2023 28 (L)     Immat GRANS % 10/17/2023 0     Lymphocytes Relative 10/17/2023 66 (H)     Monocytes Relative 10/17/2023 2 (L)     Eosinophils Relative 10/17/2023 4     Basophils Relative 10/17/2023 0     Neutrophils Absolute 10/17/2023 0.83 (L)     Immature Grans Absolute 10/17/2023 0.01     Lymphocytes Absolute 10/17/2023 1.90     Monocytes Absolute 10/17/2023 0.06 (L)     Eosinophils Absolute 10/17/2023 0.11     Basophils Absolute 10/17/2023 0.01     Sodium 10/17/2023 138     Potassium 10/17/2023 4.0     Chloride 10/17/2023 105     CO2 10/17/2023 26     ANION GAP 10/17/2023 7     BUN 10/17/2023 14     Creatinine 10/17/2023 0.97     Glucose 10/17/2023 135     Calcium 10/17/2023 9.2     AST 10/17/2023 42 (H)     ALT 10/17/2023 15     Alkaline Phosphatase 10/17/2023 29 (L)     Total Protein 10/17/2023 7.5     Albumin 10/17/2023 4.8     Total Bilirubin 10/17/2023 1.87 (H)     eGFR 10/17/2023 59     ABO Grouping 10/17/2023 B     Rh Factor 10/17/2023 Positive     Antibody Screen 10/17/2023 Negative     Specimen Expiration Date 10/17/2023 [id number]     hs TnI 0hr 10/17/2023 2     Protime 10/17/2023 14.7 (H)     INR 10/17/2023 1.09     PTT 10/17/2023 29     LACTIC ACID 10/17/2023 1.7     hs TnI 2hr 10/17/2023 <2     Delta 2hr hsTnI 10/17/2023 <0     Unit Product Code 10/17/2023 O1535J02     Unit Number 10/17/2023 [id number]     Unit ABO 10/17/2023 B     Unit RH 10/17/2023 POS     Crossmatch 10/17/2023 Compatible     Unit Dispense Status 10/17/2023 Crossmatched     Unit Product Volume 10/17/2023 350     Unit Product Code 10/17/2023 [id number]     Unit Number 10/17/2023 [id number]     Unit ABO 10/17/2023 B     Unit RH 10/17/2023 POS     Crossmatch 10/17/2023 Compatible     Unit Dispense Status 10/17/2023 Issued     Unit Product Volume 10/17/2023 350     hs TnI 4hr 10/17/2023 <2     Delta 4hr hsTnI 10/17/2023 <0     WBC 10/17/2023 2.34 (L)     RBC 10/17/2023 1.96 (L)     Hemoglobin 10/17/2023 7.5 (L)     Hematocrit 10/17/2023 22.0 (L)     MCV 10/17/2023 112 (H)     MCH 10/17/2023 38.3 (H)     MCHC 10/17/2023 34.1     MPV 10/17/2023 11.8     Platelets 47/70/7622 146 (L)     nRBC 10/17/2023 1     Neutrophils Relative 10/17/2023 28 (L)     Immat GRANS % 10/17/2023 0     Lymphocytes Relative 10/17/2023 66 (H)     Monocytes Relative 10/17/2023 2 (L)     Eosinophils Relative 10/17/2023 4     Basophils Relative 10/17/2023 0     Neutrophils Absolute 10/17/2023 0.66 (L)     Immature Grans Absolute 10/17/2023 0.00     Lymphocytes Absolute 10/17/2023 1.53     Monocytes Absolute 10/17/2023 0.05 (L)     Eosinophils Absolute 10/17/2023 0.10     Basophils Absolute 10/17/2023 0.00     Sodium 10/17/2023 140     Potassium 10/17/2023 4.2     Chloride 10/17/2023 109 (H)     CO2 10/17/2023 26     ANION GAP 10/17/2023 5     BUN 10/17/2023 12     Creatinine 10/17/2023 0.88     Glucose 10/17/2023 103     Calcium 10/17/2023 8.8     eGFR 10/17/2023 67        Imaging Studies: I have personally reviewed pertinent imaging studies. ASSESSMENT & PLAN:    Dark stools  Severe anemia  Gastric wall thickening  Pancytopenia    - Patient was sent to the ED due to evidence of a severe anemia on outpatient labs. She reports dark soft stools over the past week. - HGB was 5.8 and patient was transfused 2 units of PRBCs to 7.5.  - CT Scan C/A/P showed gastric wall thickening.  - Monitor CBC. - Protonix 40mg IV BID.  - Recommend Hematology evaluation/follow up. - Will plan for EGD today to investigate. NPO for the procedure. - Further recommendations pending results of the EGD. Thank you for this consultation. The patient will be seen and evaluated by Dr. Kwesi Rivera.     Patricia Finley PA-C  10/17/2023,1:02 PM

## 2023-10-17 NOTE — PLAN OF CARE
Problem: PAIN - ADULT  Goal: Verbalizes/displays adequate comfort level or baseline comfort level  Description: Interventions:  - Encourage patient to monitor pain and request assistance  - Assess pain using appropriate pain scale  - Administer analgesics based on type and severity of pain and evaluate response  - Implement non-pharmacological measures as appropriate and evaluate response  - Consider cultural and social influences on pain and pain management  - Notify physician/advanced practitioner if interventions unsuccessful or patient reports new pain  Outcome: Progressing     Problem: INFECTION - ADULT  Goal: Absence or prevention of progression during hospitalization  Description: INTERVENTIONS:  - Assess and monitor for signs and symptoms of infection  - Monitor lab/diagnostic results  - Monitor all insertion sites, i.e. indwelling lines, tubes, and drains  - Monitor endotracheal if appropriate and nasal secretions for changes in amount and color  - [place] appropriate cooling/warming therapies per order  - Administer medications as ordered  - Instruct and encourage patient and family to use good hand hygiene technique  - Identify and instruct in appropriate isolation precautions for identified infection/condition  Outcome: Progressing  Goal: Absence of fever/infection during neutropenic period  Description: INTERVENTIONS:  - Monitor WBC    Outcome: Progressing     Problem: SAFETY ADULT  Goal: Patient will remain free of falls  Description: INTERVENTIONS:  - Educate patient/family on patient safety including physical limitations  - Instruct patient to call for assistance with activity   - Consult OT/PT to assist with strengthening/mobility   - Keep Call bell within reach  - Keep bed low and locked with side rails adjusted as appropriate  - Keep care items and personal belongings within reach  - Initiate and maintain comfort rounds  - Make Fall Risk Sign visible to staff  - Offer Toileting every prn Hours, in advance of need  - Initiate/Maintain bed alarm  - Obtain necessary fall risk management equipment: yellow sock , bed alarm on,    - Apply yellow socks and bracelet for high fall risk patients  - Consider moving patient to room near nurses station  Outcome: Progressing  Goal: Maintain or return to baseline ADL function  Description: INTERVENTIONS:  -  Assess patient's ability to carry out ADLs; assess patient's baseline for ADL function and identify physical deficits which impact ability to perform ADLs (bathing, care of mouth/teeth, toileting, grooming, dressing, etc.)  - Assess/evaluate cause of self-care deficits   - Assess range of motion  - Assess patient's mobility; develop plan if impaired  - Assess patient's need for assistive devices and provide as appropriate  - Encourage maximum independence but intervene and supervise when necessary  - Involve family in performance of ADLs  - Assess for home care needs following discharge   - Consider OT consult to assist with ADL evaluation and planning for discharge  - Provide patient education as appropriate  Outcome: Progressing  Goal: Maintains/Returns to pre admission functional level  Description: INTERVENTIONS:  - Perform BMAT or MOVE assessment daily.   - Set and communicate daily mobility goal to care team and patient/family/caregiver. - Collaborate with rehabilitation services on mobility goals if consulted  - Perform Range of Motion self times a day. - Reposition patient every self turn hours.   - Dangle patient self  times a day  - Stand patient self standing times a day  - Ambulate patient 4x times a day  - Out of bed to chair 3x times a day   - Out of bed for meals 3x times a day  - Out of bed for toileting  - Record patient progress and toleration of activity level   Outcome: Progressing     Problem: DISCHARGE PLANNING  Goal: Discharge to home or other facility with appropriate resources  Description: INTERVENTIONS:  - Identify barriers to discharge w/patient and caregiver  - Arrange for needed discharge resources and transportation as appropriate  - Identify discharge learning needs (meds, wound care, etc.)  - Arrange for interpretive services to assist at discharge as needed  - Refer to Case Management Department for coordinating discharge planning if the patient needs post-hospital services based on physician/advanced practitioner order or complex needs related to functional status, cognitive ability, or social support system  Outcome: Progressing     Problem: Knowledge Deficit  Goal: Patient/family/caregiver demonstrates understanding of disease process, treatment plan, medications, and discharge instructions  Description: Complete learning assessment and assess knowledge base.   Interventions:  - Provide teaching at level of understanding  - Provide teaching via preferred learning methods  Outcome: Progressing

## 2023-10-17 NOTE — ED ATTENDING ATTESTATION
10/17/2023  I, Neva Horner MD, saw and evaluated the patient. I have discussed the patient with the resident/non-physician practitioner and agree with the resident's/non-physician practitioner's findings, Plan of Care, and MDM as documented in the resident's/non-physician practitioner's note, except where noted. All available labs and Radiology studies were reviewed. I was present for key portions of any procedure(s) performed by the resident/non-physician practitioner and I was immediately available to provide assistance. At this point I agree with the current assessment done in the Emergency Department. I have conducted an independent evaluation of this patient a history and physical is as follows:    70 y.o. female presents after being contacted by her primary care physician for her hemoglobin reportedly being low. Unfortunately the laboratory testing is unavailable as the patient obtained that at [facility] and it does not appear to have been released yet. Patient was told to come to the emergency room for a transfusion. Patient denies hematemesis. Patient denies melena or hematochezia though she does note copious amounts of diarrhea over the past 2 weeks. Patient does not appear to be a particularly accurate historian though she does answer directed questions appropriately. Patient denies any abdominal pain. Patient and her daughter deny any vomiting. Patient denies any recent international travel. Patient denies any recent antibiotics. ROS: Patient affirms fatigue though it is the middle of the night and she was awoken to come to the emergency room; patient denies syncope, weakness, confusion, weight loss, change in bowel movements, fever/chills, nausea/vomiting, diarrhea, dyspnea, anorexia, constipation, diaphoresis, chest pain, groin pain, dysuria, hematuria, or back/neck pain. All other systems reviewed and negative. Patient denies any use of iron or bismuth.  Patient denies any significant consumption of beets or red food coloring. Patient denies a history of prior colonoscopy. Patient denies a history of prior EGD. The reliability of this history is somewhat questionable but the patient's daughter also does not recall any and I cannot find any in the medical record. Notably patient was admitted in January and had a hemoglobin of 6 though she was diagnosed with COVID-19 at that point and there did not appear to have been any further investigation or intervention and her hemoglobin stabilized after a single unit of PRBCs in the emergency room. Patient amenable to transfusion of blood products, if necessary. Focused Objective:  Vital signs reviewed. Eyes: No scleral icterus. HENT: Head normocephalic. No pharnyngeal or nasal bleeding noted. CV:  Regular rate and rhythm. Respiratory:  Lungs clear to auscultation bilaterally without adventitious sounds. Abdomen:  Inspection of an abdomen without erythema, rashes or ecchymosis noted. No abdominal distention. No abdominal pulsations noted. Soft abdomen. Palpitation noted no tenderness to palpitation. No masses or pulsatile aorta noted on examination. No rebound or guarding noted on examination, non-peritoneal exam.    Skin:  Warm and dry. No purpora or petechiae. Extremities: No edema. No palmar erythema. Medical Decision Making  Anemia with a broad differential.  Patient previously had a pancytopenia that this was at the same time of COVID-19. Patient does not appear to have a colonoscopy previously according to her and from what I can ascertain from the medical record. Patient does note copious amounts of diarrhea that she states has been dark but not black, considering constellation of symptoms and no prior history along with patient's age, concerns for potential possible gastrointestinal bleeding though the differential is quite large.   Patient has been referred to hematology which I do feel is appropriate and I encouraged her to continue this on an outpatient basis if patient's hemoglobin stabilizes while in the emergency room. Will establish large bore IV access and make patient NPO considering possibility of surgical intervention required. Patient placed on continuous cardiopulmonary monitoring. Will administer pantoprazole for symptomatic relief. Will obtain CBC to evaluate for anemia and platelet count. Will check patient's PT to evaluate for potential coagulopathy. Will obtain type and screen considering patient may need transfusion. Will obtain CMP to evaluate electrolytes, renal and hepatic function. Will obtain lactate to evaluate for mesenteric ischemia and sepsis. We will monitor and reassess. Plan for admission if hemoglobin is confirmed to be significantly lower than prior as patient has not had prior evaluation for this.       ED Course         Critical Care Time  Procedures

## 2023-10-17 NOTE — ASSESSMENT & PLAN NOTE
Patient reports experiencing dark diarrhea over the past week. She had outpatient lab work performed yesterday, and reports that her PCP asked that she present to the ED due to an abnormality with her lab work. Otherwise she currently she denies acute symptoms or complaint.     /70   Pulse 104   Temp 97.7 °F (36.5 °C)   Resp 18   SpO2 100%     Reporting dark diarrhea over the past week  Not currently on outpatient AC  HGB 5.8 on ED presentation  Abd non-peritoneal, no guarding, non-distended on admission  1U PRBC infusion initiated in ED (not yet initiated on admission; awaiting blood)   q6H HGB monitoring  Transfuse to maintain HGB >7  Made NPO  GI consulted; recommendations appreciated

## 2023-10-17 NOTE — ASSESSMENT & PLAN NOTE
Lab Results   Component Value Date    HGBA1C 7.3 (H) 10/10/2023     -Pt currently NPO. Will discontinue metformin and actos for now.

## 2023-10-17 NOTE — H&P
[address]  H&P  Name: Dirk Suarez 70 y.o. female I MRN: [id number]  Unit/Bed#: ED 25 I Date of Admission: 10/17/2023   Date of Service: 10/17/2023 I Hospital Day: 0      Assessment/Plan   * Melena  Assessment & Plan  Patient reports experiencing dark diarrhea over the past week. She had outpatient lab work performed yesterday, and reports that her PCP asked that she present to the ED due to an abnormality with her lab work. Otherwise she currently she denies acute symptoms or complaint. /69 (BP Location: Right arm)   Pulse 91   Temp 97.7 °F (36.5 °C)   Resp 18   SpO2 100%     Reporting dark diarrhea over the past week  Not currently on outpatient AC  HGB 5.8 on ED presentation  Abd non-peritoneal, no guarding, non-distended on admission  1U PRBC infusion initiated in ED (not yet initiated on admission; awaiting blood)   Discussed with ED provider; plan to admit for further GI work-up and blood transfusion  q6H HGB monitoring  Transfuse to maintain HGB >7  Made NPO  GI consulted; recommendations appreciated     DM type 2 (diabetes mellitus, type 2) ([facility])  Assessment & Plan    Lab Results   Component Value Date    HGBA1C 7.3 (H) 10/10/2023     Hold home oral antihyperglycemics   q6H POCT BG checks while NPO  Hypoglycemia protocol     Seizure-like activity ([address])  Assessment & Plan  Reports she is no longer taking outpatient keppra    Hyperlipidemia associated with type 2 diabetes mellitus   Assessment & Plan    Lab Results   Component Value Date    HGBA1C 7.3 (H) 10/10/2023     Continue home statin         VTE Pharmacologic Prophylaxis: VTE Score: 3 Moderate Risk (Score 3-4) - Pharmacological DVT Prophylaxis Contraindicated. Sequential Compression Devices Ordered. Code Status: Prior   Discussion with family: Updated  (daughter) at bedside.     Anticipated Length of Stay: Patient will be admitted on an inpatient basis with an anticipated length of stay of greater than 2 midnights secondary to acute anemia w/ concern for GI Bleed . Chief Complaint: Abnormal outpatient lab work    History of Present Illness:  Sylvester Foy is a 70 y.o. female with a PMH of T2DM and HLD who presents with abnormal outpatient labwork. Patient reports experiencing dark diarrhea over the past week. She had outpatient lab work performed yesterday, and reports that her PCP asked that she present to the ED due to an abnormality with her lab work. Otherwise she currently she denies acute symptoms or complaint. All questions answered at the bedside to the best of my ability. Review of Systems:  Review of Systems   Constitutional:  Negative for activity change, appetite change, chills, diaphoresis, fatigue and fever. HENT:  Negative for congestion, ear pain, nosebleeds and trouble swallowing. Eyes:  Negative for pain and visual disturbance. Respiratory:  Negative for apnea, cough, chest tightness, shortness of breath and wheezing. Cardiovascular:  Negative for chest pain, palpitations and leg swelling. Gastrointestinal:  Positive for diarrhea (dark diarrhea). Negative for abdominal distention, abdominal pain, blood in stool, constipation, nausea and vomiting. Endocrine: Negative for cold intolerance, heat intolerance and polyuria. Genitourinary:  Negative for difficulty urinating, dysuria, flank pain and hematuria. Musculoskeletal:  Negative for arthralgias, neck pain and neck stiffness. Skin:  Negative for color change, rash and wound. Neurological:  Negative for dizziness, tremors, syncope, weakness, light-headedness, numbness and headaches. Hematological:  Negative for adenopathy. All other systems reviewed and are negative. Past Medical and Surgical History:   Past Medical History:   Diagnosis Date    Anemia     Diabetes mellitus ([address])        History reviewed. No pertinent surgical history.     Meds/Allergies:  Prior to Admission medications    Medication Sig Start Date End Date Taking? Authorizing Provider   hydrOXYzine HCL (ATARAX) 25 mg tablet Take 1 tablet (25 mg total) by mouth every 6 (six) hours As needed for itching 1/8/20   Estella Sheets DO   levETIRAcetam (KEPPRA) 500 mg tablet Take 1 tablet (500 mg total) by mouth every 12 (twelve) hours 11/29/20   Alyse Baker DO   metFORMIN (GLUCOPHAGE-XR) 500 mg 24 hr tablet TAKE 4 TABLETS ONCE DAILY 10/14/19   Historical Provider, MD   pioglitazone (ACTOS) 30 mg tablet Take 30 mg by mouth daily 4/17/19   Historical Provider, MD   simvastatin (ZOCOR) 40 mg tablet TAKE 1 TABLET NIGHTLY 10/14/19   Historical Provider, MD BROWN have reviewed home medications using recent Epic encounter. Allergies: Allergies   Allergen Reactions    Penicillin G      Other reaction(s): Dizzy       Social History:  Marital Status: Single   Occupation: N/A  Patient Pre-hospital Living Situation: Home  Patient Pre-hospital Level of Mobility: walks  Patient Pre-hospital Diet Restrictions: Diabetic  Substance Use History:   Social History     Substance and Sexual Activity   Alcohol Use Never     Social History     Tobacco Use   Smoking Status Never   Smokeless Tobacco Never     Social History     Substance and Sexual Activity   Drug Use Never       Family History:  History reviewed. No pertinent family history. Physical Exam:     Vitals:   Blood Pressure: 152/69 (10/17/23 0330)  Pulse: 91 (10/17/23 0330)  Temperature: 97.7 °F (36.5 °C) (10/17/23 0300)  Respirations: 18 (10/17/23 0330)  SpO2: 100 % (10/17/23 0330)    Physical Exam  Vitals and nursing note reviewed. Constitutional:       General: She is not in acute distress. Appearance: Normal appearance. HENT:      Head: Normocephalic and atraumatic. Right Ear: External ear normal.      Left Ear: External ear normal.      Nose: Nose normal.      Mouth/Throat:      Mouth: Mucous membranes are moist.   Eyes:      Pupils: Pupils are equal, round, and reactive to light. Cardiovascular:      Rate and Rhythm: Normal rate and regular rhythm. Pulses: Normal pulses. Heart sounds: Normal heart sounds. No murmur heard. Pulmonary:      Effort: Pulmonary effort is normal. No respiratory distress. Breath sounds: Normal breath sounds. No wheezing or rales. Chest:      Chest wall: No tenderness. Abdominal:      General: Bowel sounds are normal. There is no distension. Palpations: Abdomen is soft. There is no mass. Tenderness: There is no abdominal tenderness. There is no guarding. Musculoskeletal:         General: No swelling or tenderness. Cervical back: Normal range of motion and neck supple. No rigidity or tenderness. Right lower leg: No edema. Left lower leg: No edema. Skin:     General: Skin is warm and dry. Capillary Refill: Capillary refill takes less than 2 seconds. Findings: No lesion or rash. Neurological:      General: No focal deficit present. Mental Status: She is alert.    Psychiatric:         Mood and Affect: Mood normal.          Additional Data:     Lab Results:  Results from last 7 days   Lab Units 10/17/23  0319   WBC Thousand/uL 2.92*   HEMOGLOBIN g/dL 5.8*   HEMATOCRIT % 16.9*   PLATELETS Thousands/uL 185   NEUTROS PCT % 28*   LYMPHS PCT % 66*   MONOS PCT % 2*   EOS PCT % 4     Results from last 7 days   Lab Units 10/17/23  0319   SODIUM mmol/L 138   POTASSIUM mmol/L 4.0   CHLORIDE mmol/L 105   CO2 mmol/L 26   BUN mg/dL 14   CREATININE mg/dL 0.97   ANION GAP mmol/L 7   CALCIUM mg/dL 9.2   ALBUMIN g/dL 4.8   TOTAL BILIRUBIN mg/dL 1.87*   ALK PHOS U/L 29*   ALT U/L 15   AST U/L 42*   GLUCOSE RANDOM mg/dL 135     Results from last 7 days   Lab Units 10/17/23  0319   INR  1.09         Results from last 7 days   Lab Units 10/10/23  0954   HEMOGLOBIN A1C % 7.3*     Results from last 7 days   Lab Units 10/17/23  0319   LACTIC ACID mmol/L 1.7       Lines/Drains:  Invasive Devices       Peripheral Intravenous Line Duration             Peripheral IV 10/17/23 Left Arm <1 day    Peripheral IV 10/17/23 Right Hand <1 day                        Imaging: No pertinent imaging reviewed. No orders to display       EKG and Other Studies Reviewed on Admission:   EKG: No EKG obtained. ** Please Note: This note has been constructed using a voice recognition system.  **

## 2023-10-18 ENCOUNTER — ANESTHESIA EVENT (INPATIENT)
Dept: GASTROENTEROLOGY | Facility: HOSPITAL | Age: 68
End: 2023-10-18
Payer: MEDICARE

## 2023-10-18 ENCOUNTER — ANESTHESIA (INPATIENT)
Dept: GASTROENTEROLOGY | Facility: HOSPITAL | Age: 68
End: 2023-10-18
Payer: MEDICARE

## 2023-10-18 ENCOUNTER — APPOINTMENT (INPATIENT)
Dept: GASTROENTEROLOGY | Facility: HOSPITAL | Age: 68
DRG: 378 | End: 2023-10-18
Attending: INTERNAL MEDICINE
Payer: MEDICARE

## 2023-10-18 VITALS
OXYGEN SATURATION: 98 % | HEART RATE: 92 BPM | WEIGHT: 141.98 LBS | HEIGHT: 63 IN | DIASTOLIC BLOOD PRESSURE: 71 MMHG | SYSTOLIC BLOOD PRESSURE: 128 MMHG | TEMPERATURE: 98.5 F | BODY MASS INDEX: 25.16 KG/M2 | RESPIRATION RATE: 16 BRPM

## 2023-10-18 LAB
ALBUMIN SERPL BCP-MCNC: 4.1 G/DL (ref 3.5–5)
ALP SERPL-CCNC: 27 U/L (ref 34–104)
ALT SERPL W P-5'-P-CCNC: 14 U/L (ref 7–52)
ANION GAP SERPL CALCULATED.3IONS-SCNC: 3 MMOL/L
ANISOCYTOSIS BLD QL SMEAR: PRESENT
AST SERPL W P-5'-P-CCNC: 36 U/L (ref 13–39)
BASO STIPL BLD QL SMEAR: PRESENT
BASOPHILS # BLD MANUAL: 0 THOUSAND/UL (ref 0–0.1)
BASOPHILS NFR MAR MANUAL: 0 % (ref 0–1)
BILIRUB SERPL-MCNC: 2.54 MG/DL (ref 0.2–1)
BUN SERPL-MCNC: 11 MG/DL (ref 5–25)
CALCIUM SERPL-MCNC: 8.9 MG/DL (ref 8.4–10.2)
CHLORIDE SERPL-SCNC: 107 MMOL/L (ref 96–108)
CO2 SERPL-SCNC: 30 MMOL/L (ref 21–32)
CREAT SERPL-MCNC: 0.82 MG/DL (ref 0.6–1.3)
EOSINOPHIL # BLD MANUAL: 0.05 THOUSAND/UL (ref 0–0.4)
EOSINOPHIL NFR BLD MANUAL: 2 % (ref 0–6)
GFR SERPL CREATININE-BSD FRML MDRD: 73 ML/MIN/1.73SQ M
GLUCOSE SERPL-MCNC: 112 MG/DL (ref 65–140)
GLUCOSE SERPL-MCNC: 114 MG/DL (ref 65–140)
GLUCOSE SERPL-MCNC: 121 MG/DL (ref 65–140)
GLUCOSE SERPL-MCNC: 134 MG/DL (ref 65–140)
HCT VFR BLD AUTO: 21.9 % (ref 34.8–46.1)
HCT VFR BLD AUTO: 22.1 % (ref 34.8–46.1)
HGB BLD-MCNC: 7.7 G/DL (ref 11.5–15.4)
HGB BLD-MCNC: 7.8 G/DL (ref 11.5–15.4)
LYMPHOCYTES # BLD AUTO: 1.01 THOUSAND/UL (ref 0.6–4.47)
LYMPHOCYTES # BLD AUTO: 42 % (ref 14–44)
MACROCYTES BLD QL AUTO: PRESENT
MCH RBC QN AUTO: 38.1 PG (ref 26.8–34.3)
MCHC RBC AUTO-ENTMCNC: 34.8 G/DL (ref 31.4–37.4)
MCV RBC AUTO: 109 FL (ref 82–98)
MONOCYTES # BLD AUTO: 0 THOUSAND/UL (ref 0–1.22)
MONOCYTES NFR BLD: 0 % (ref 4–12)
NEUTROPHILS # BLD MANUAL: 1.34 THOUSAND/UL (ref 1.85–7.62)
NEUTS SEG NFR BLD AUTO: 56 % (ref 43–75)
PLATELET # BLD AUTO: 161 THOUSANDS/UL (ref 149–390)
PLATELET BLD QL SMEAR: ADEQUATE
PMV BLD AUTO: 12.4 FL (ref 8.9–12.7)
POIKILOCYTOSIS BLD QL SMEAR: PRESENT
POLYCHROMASIA BLD QL SMEAR: PRESENT
POTASSIUM SERPL-SCNC: 3.9 MMOL/L (ref 3.5–5.3)
PROCALCITONIN SERPL-MCNC: 0.09 NG/ML
PROT SERPL-MCNC: 6.5 G/DL (ref 6.4–8.4)
RBC # BLD AUTO: 2.02 MILLION/UL (ref 3.81–5.12)
SCHISTOCYTES BLD QL SMEAR: PRESENT
SODIUM SERPL-SCNC: 140 MMOL/L (ref 135–147)
TARGETS BLD QL SMEAR: PRESENT
WBC # BLD AUTO: 2.4 THOUSAND/UL (ref 4.31–10.16)

## 2023-10-18 PROCEDURE — 82948 REAGENT STRIP/BLOOD GLUCOSE: CPT

## 2023-10-18 PROCEDURE — 99233 SBSQ HOSP IP/OBS HIGH 50: CPT | Performed by: STUDENT IN AN ORGANIZED HEALTH CARE EDUCATION/TRAINING PROGRAM

## 2023-10-18 PROCEDURE — 85014 HEMATOCRIT: CPT

## 2023-10-18 PROCEDURE — 80053 COMPREHEN METABOLIC PANEL: CPT | Performed by: NURSE PRACTITIONER

## 2023-10-18 PROCEDURE — 85027 COMPLETE CBC AUTOMATED: CPT | Performed by: NURSE PRACTITIONER

## 2023-10-18 PROCEDURE — 85018 HEMOGLOBIN: CPT

## 2023-10-18 PROCEDURE — 87040 BLOOD CULTURE FOR BACTERIA: CPT | Performed by: NURSE PRACTITIONER

## 2023-10-18 PROCEDURE — 84145 PROCALCITONIN (PCT): CPT | Performed by: NURSE PRACTITIONER

## 2023-10-18 PROCEDURE — 0DJD8ZZ INSPECTION OF LOWER INTESTINAL TRACT, VIA NATURAL OR ARTIFICIAL OPENING ENDOSCOPIC: ICD-10-PCS | Performed by: INTERNAL MEDICINE

## 2023-10-18 PROCEDURE — 85007 BL SMEAR W/DIFF WBC COUNT: CPT | Performed by: NURSE PRACTITIONER

## 2023-10-18 RX ORDER — FERROUS SULFATE 324(65)MG
324 TABLET, DELAYED RELEASE (ENTERIC COATED) ORAL
Qty: 30 TABLET | Refills: 0 | Status: SHIPPED | OUTPATIENT
Start: 2023-10-18 | End: 2023-11-17

## 2023-10-18 RX ORDER — SODIUM CHLORIDE, SODIUM LACTATE, POTASSIUM CHLORIDE, CALCIUM CHLORIDE 600; 310; 30; 20 MG/100ML; MG/100ML; MG/100ML; MG/100ML
125 INJECTION, SOLUTION INTRAVENOUS CONTINUOUS
Status: CANCELLED | OUTPATIENT
Start: 2023-10-18

## 2023-10-18 RX ORDER — PROPOFOL 10 MG/ML
INJECTION, EMULSION INTRAVENOUS AS NEEDED
Status: DISCONTINUED | OUTPATIENT
Start: 2023-10-18 | End: 2023-10-18

## 2023-10-18 RX ORDER — SODIUM CHLORIDE, SODIUM LACTATE, POTASSIUM CHLORIDE, CALCIUM CHLORIDE 600; 310; 30; 20 MG/100ML; MG/100ML; MG/100ML; MG/100ML
INJECTION, SOLUTION INTRAVENOUS CONTINUOUS PRN
Status: DISCONTINUED | OUTPATIENT
Start: 2023-10-18 | End: 2023-10-18

## 2023-10-18 RX ORDER — CYANOCOBALAMIN (VITAMIN B-12) 250 MCG
250 TABLET ORAL DAILY
Qty: 30 TABLET | Refills: 0 | Status: SHIPPED | OUTPATIENT
Start: 2023-10-18 | End: 2023-11-17

## 2023-10-18 RX ADMIN — PROPOFOL 100 MG: 10 INJECTION, EMULSION INTRAVENOUS at 14:45

## 2023-10-18 RX ADMIN — SODIUM CHLORIDE, SODIUM LACTATE, POTASSIUM CHLORIDE, AND CALCIUM CHLORIDE: .6; .31; .03; .02 INJECTION, SOLUTION INTRAVENOUS at 14:40

## 2023-10-18 NOTE — DISCHARGE INSTR - AVS FIRST PAGE
Dear Clista Castleman,     It was our pleasure to care for you here at [facility]. It is our hope that we were always able to exceed the expected standards for your care during your stay. You were hospitalized due to anemia. You were cared for on the 2nd floor by Chikis Brooks MD under the service of Manuel Lan MD with the [facility] who covers for your primary care physician (PCP), Marcy Reyes MD, while you were hospitalized. If you have any questions or concerns related to this hospitalization, you may contact us at [phone]. For follow up as well as any medication refills, we recommend that you follow up with your primary care physician. A registered nurse will reach out to you by phone within a few days after your discharge to answer any additional questions that you may have after going home. However, at this time we provide for you here, the most important instructions / recommendations at discharge:     Notable Medication Adjustments -   Completed endoscopy and colonoscopy without active bleeding. Likely have iron deficiency and B12 deficiency anemia. I referred you to hematology as an outpatient for IV iron infusions. Also ordered iron and b12 supplement  Testing Required after Discharge -   See above  Important follow up information -   na  Other Instructions -   na  Please review this entire after visit summary as additional general instructions including medication list, appointments, activity, diet, any pertinent wound care, and other additional recommendations from your care team that may be provided for you.       Sincerely,     Chikis Brooks MD

## 2023-10-18 NOTE — CASE MANAGEMENT
Case Management Progress Note    Patient name Teresa Whiting  Location /-69 MRN [id number]  : 1955 Date 10/18/2023       LOS (days): 1  Geometric Mean LOS (GMLOS) (days): 2.10  Days to GMLOS:0.6        OBJECTIVE:        Current admission status: Inpatient  Preferred Pharmacy:   [facility] [address] [facility] [id number] - [place] [facility] [address] [address] [address]  [place] [address] [facility] [address]  Phone: [phone] Fax: [phone]    Primary Care Provider: Bina Beth MD    Primary Insurance: MEDICARE  Secondary Insurance:     PROGRESS NOTE:  Attempted to conduct CM assessment with patient, but she was in colonoscopy procedure. Discussed case with provider in rounds this am, and no CM needs anticipated. Discussed potential needs with primary RN as well, and no needs identified. Patient is discharged.

## 2023-10-18 NOTE — ASSESSMENT & PLAN NOTE
-Plan for colonoscopy today   -EGD normal, no sign of active bleeding; stomach and duodenal tissue pathology report pending  -Blood culture pending  -Iron studies normal but cannot rule out iron deficiency anemia as labs were drawn after transfusion  -Will recommend Vitamin B12 shots and supplementation in the setting of low B12 levels (<50pg/mL).   -Continue to monitor hemoglobin level q12h  -Advance diet after colonoscopy  -Prep for discharge and outpatient follow up with hematologist

## 2023-10-18 NOTE — ANESTHESIA POSTPROCEDURE EVALUATION
Post-Op Assessment Note    CV Status:  Stable    Pain management: adequate     Mental Status:  Alert and awake   Hydration Status:  Euvolemic   PONV Controlled:  Controlled   Airway Patency:  Patent   Two or more mitigation strategies used for obstructive sleep apnea   Post Op Vitals Reviewed: Yes      Staff: Anesthesiologist, CRNA         No notable events documented.     /63 (10/18/23 1500)    Temp      Pulse 95 (10/18/23 1500)   Resp 18 (10/18/23 1500)    SpO2 100 % (10/18/23 1500)

## 2023-10-18 NOTE — CASE MANAGEMENT
Case Management Progress Note    Patient name Bari Borders  Location /-63 MRN [id number]  : 1955 Date 10/18/2023       LOS (days): 1  Geometric Mean LOS (GMLOS) (days): 2.10  Days to GMLOS:0.8        OBJECTIVE:        Current admission status: Inpatient  Preferred Pharmacy:   [facility] [address] [facility] [id number] - [facility] [address] [address] [address]  [place] [address] [place] [address]  Phone: [phone] Fax: [phone]    Primary Care Provider: Akbar Ladd MD    Primary Insurance: MEDICARE  Secondary Insurance:     PROGRESS NOTE:  Went to patient's bedside to do CM assessment, but patient was not in the room.  Will try later after patient's procedure

## 2023-10-18 NOTE — ANESTHESIA PREPROCEDURE EVALUATION
Procedure:  COLONOSCOPY    Relevant Problems   ANESTHESIA (within normal limits)      CARDIO   (+) Hyperlipidemia associated with type 2 diabetes mellitus       ENDO   (+) DM type 2 (diabetes mellitus, type 2) (HCC)      GI/HEPATIC  Confirmed NPO appropriate  S/p bowel prep      /RENAL (within normal limits)      HEMATOLOGY   (+) Pancytopenia (HCC)      MUSCULOSKELETAL (within normal limits)      NEURO/PSYCH (within normal limits)      PULMONARY (within normal limits)   (-) Smoking   (-) URI (upper respiratory infection)        Physical Exam    Airway    Mallampati score: II  TM Distance: >3 FB  Neck ROM: full     Dental   No notable dental hx     Cardiovascular  Cardiovascular exam normal    Pulmonary  Pulmonary exam normal     Other Findings        Anesthesia Plan  ASA Score- 2     Anesthesia Type- IV sedation with anesthesia with ASA Monitors. Additional Monitors:     Airway Plan:     Comment: I discussed the risks and benefits of IV sedation anesthesia including the possibility of the need to convert to general anesthesia and the potential risk of awareness. The patient was given the opportunity to ask questions, which were answered. .       Plan Factors-Exercise tolerance (METS): >4 METS. Chart reviewed. Existing labs reviewed. Patient is not a current smoker. Induction- intravenous. Postoperative Plan-     Informed Consent- Anesthetic plan and risks discussed with patient. I personally reviewed this patient with the CRNA. Discussed and agreed on the Anesthesia Plan with the CRNA. .            Lab Results   Component Value Date    WBC 2.40 (L) 10/18/2023    HGB 7.7 (L) 10/18/2023    HCT 22.1 (L) 10/18/2023     (H) 10/18/2023     10/18/2023

## 2023-10-18 NOTE — PLAN OF CARE
Problem: PAIN - ADULT  Goal: Verbalizes/displays adequate comfort level or baseline comfort level  Description: Interventions:  - Encourage patient to monitor pain and request assistance  - Assess pain using appropriate pain scale  - Administer analgesics based on type and severity of pain and evaluate response  - Implement non-pharmacological measures as appropriate and evaluate response  - Consider cultural and social influences on pain and pain management  - Notify physician/advanced practitioner if interventions unsuccessful or patient reports new pain  Outcome: Progressing     Problem: INFECTION - ADULT  Goal: Absence or prevention of progression during hospitalization  Description: INTERVENTIONS:  - Assess and monitor for signs and symptoms of infection  - Monitor lab/diagnostic results  - Monitor all insertion sites, i.e. indwelling lines, tubes, and drains  - Monitor endotracheal if appropriate and nasal secretions for changes in amount and color  - Lamar appropriate cooling/warming therapies per order  - Administer medications as ordered  - Instruct and encourage patient and family to use good hand hygiene technique  - Identify and instruct in appropriate isolation precautions for identified infection/condition  Outcome: Progressing  Goal: Absence of fever/infection during neutropenic period  Description: INTERVENTIONS:  - Monitor WBC    Outcome: Progressing     Problem: SAFETY ADULT  Goal: Patient will remain free of falls  Description: INTERVENTIONS:  - Educate patient/family on patient safety including physical limitations  - Instruct patient to call for assistance with activity   - Consult OT/PT to assist with strengthening/mobility   - Keep Call bell within reach  - Keep bed low and locked with side rails adjusted as appropriate  - Keep care items and personal belongings within reach  - Initiate and maintain comfort rounds  - Make Fall Risk Sign visible to staff  - Offer Toileting every 2 Hours, in advance of need  - Initiate/Maintain bed alarm  - Obtain necessary fall risk management equipment  - Apply yellow socks and bracelet for high fall risk patients  - Consider moving patient to room near nurses station  Outcome: Progressing  Goal: Maintain or return to baseline ADL function  Description: INTERVENTIONS:  -  Assess patient's ability to carry out ADLs; assess patient's baseline for ADL function and identify physical deficits which impact ability to perform ADLs (bathing, care of mouth/teeth, toileting, grooming, dressing, etc.)  - Assess/evaluate cause of self-care deficits   - Assess range of motion  - Assess patient's mobility; develop plan if impaired  - Assess patient's need for assistive devices and provide as appropriate  - Encourage maximum independence but intervene and supervise when necessary  - Involve family in performance of ADLs  - Assess for home care needs following discharge   - Consider OT consult to assist with ADL evaluation and planning for discharge  - Provide patient education as appropriate  Outcome: Progressing  Goal: Maintains/Returns to pre admission functional level  Description: INTERVENTIONS:  - Perform BMAT or MOVE assessment daily.   - Set and communicate daily mobility goal to care team and patient/family/caregiver. - Collaborate with rehabilitation services on mobility goals if consulted  - Perform Range of Motion 3 times a day. - Reposition patient every 2 hours.   - Dangle patient 3 times a day  - Stand patient 3 times a day  - Ambulate patient 3 times a day  - Out of bed to chair 3 times a day   - Out of bed for meals 3 times a day  - Out of bed for toileting  - Record patient progress and toleration of activity level   Outcome: Progressing     Problem: DISCHARGE PLANNING  Goal: Discharge to home or other facility with appropriate resources  Description: INTERVENTIONS:  - Identify barriers to discharge w/patient and caregiver  - Arrange for needed discharge resources and transportation as appropriate  - Identify discharge learning needs (meds, wound care, etc.)  - Arrange for interpretive services to assist at discharge as needed  - Refer to Case Management Department for coordinating discharge planning if the patient needs post-hospital services based on physician/advanced practitioner order or complex needs related to functional status, cognitive ability, or social support system  Outcome: Progressing     Problem: Knowledge Deficit  Goal: Patient/family/caregiver demonstrates understanding of disease process, treatment plan, medications, and discharge instructions  Description: Complete learning assessment and assess knowledge base.   Interventions:  - Provide teaching at level of understanding  - Provide teaching via preferred learning methods  Outcome: Progressing

## 2023-10-18 NOTE — PROGRESS NOTES
[address]  Progress Note  Name: Nilson Hampton I  MRN: [id number]  Unit/Bed#: -01 I Date of Admission: 10/17/2023   Date of Service: 10/18/2023 I Hospital Day: 1    Assessment/Plan   * Melena  Assessment & Plan  -Plan for colonoscopy today   -EGD normal, no sign of active bleeding; stomach and duodenal tissue pathology report pending  -Blood culture pending  -Iron studies normal but cannot rule out iron deficiency anemia as labs were drawn after pRBC transfusion  -Recommend B12 supplement  -Continue to monitor hemoglobin   -Advance diet after colonoscopy  -Prep for discharge and outpatient follow up with hematologist    DM type 2 (diabetes mellitus, type 2) ([address] [facility])  Assessment & Plan  Lab Results   Component Value Date    HGBA1C 7.3 (H) 10/10/2023     -Adequately controlled     Hyperlipidemia associated with type 2 diabetes mellitus   Assessment & Plan  Lab Results   Component Value Date    HGBA1C 7.3 (H) 10/10/2023     -Continue home statin    Seizure-like activity (HCC)  Assessment & Plan  -Pt reports she is no longer taking keppra. VTE Pharmacologic Prophylaxis: VTE Score: 3 Moderate Risk (Score 3-4) - Pharmacological DVT Prophylaxis Ordered     Total Time Spent on Date of Encounter in care of patient: 20 mins. This time was spent on one or more of the following: performing physical exam; counseling and coordination of care; obtaining or reviewing history; documenting in the medical record; reviewing/ordering tests, medications or procedures; communicating with other healthcare professionals and discussing with patient's family/caregivers. Current Length of Stay: 1 day(s)  Current Patient Status: Inpatient   Code Status: Level 1 - Full Code    Subjective:   Brant Driscoll is a 70 yr old female with a PMH of anemia and diabetes admitted for an emergent blood transfusion due to a low hgb of 5.8.  Pt reports no shortness of breath, chest pain, palpitations, dizziness, nausea or vomiting overnight. Pt had 1 bowel movement overnight and described it as soft and medium to dark brown. Pt also states that her epigastric tenderness has since resolved. Objective:     Vitals:   Temp (24hrs), Av.1 °F (37.3 °C), Min:98 °F (36.7 °C), Max:100.7 °F (38.2 °C)    Temp:  [98 °F (36.7 °C)-100.7 °F (38.2 °C)] 98.3 °F (36.8 °C)  HR:  [] 106  Resp:  [16-22] 20  BP: (120-159)/(58-92) 121/71  SpO2:  [94 %-100 %] 100 %  Body mass index is 25.15 kg/m². Input and Output Summary (last 24 hours): Intake/Output Summary (Last 24 hours) at 10/18/2023 1247  Last data filed at 10/17/2023 1348  Gross per 24 hour   Intake 150 ml   Output --   Net 150 ml       Physical Exam:   Physical Exam  Constitutional:       General: She is not in acute distress. HENT:      Head: Normocephalic and atraumatic. Nose: Nose normal. No congestion. Mouth/Throat:      Mouth: Mucous membranes are moist.   Eyes:      General: No scleral icterus. Pupils: Pupils are equal, round, and reactive to light. Comments: Conjunctiva pale   Cardiovascular:      Rate and Rhythm: Normal rate and regular rhythm. Heart sounds: Normal heart sounds. Pulmonary:      Breath sounds: Normal breath sounds. Abdominal:      General: Bowel sounds are normal. There is no distension. Palpations: Abdomen is soft. Tenderness: There is no abdominal tenderness. Musculoskeletal:      Cervical back: Normal range of motion. Neurological:      Mental Status: She is alert.        Additional Data:     Labs:  Results from last 7 days   Lab Units 10/18/23  0622 10/17/23  1555 10/17/23  0743   WBC Thousand/uL 2.40*  --  2.34*   HEMOGLOBIN g/dL 7.7*   < > 7.5*   HEMATOCRIT % 22.1*   < > 22.0*   PLATELETS Thousands/uL 161  --  146*   NEUTROS PCT %  --   --  28*   LYMPHS PCT %  --   --  66*   LYMPHO PCT % 42  --   --    MONOS PCT %  --   --  2*   MONO PCT % 0*  --   --    EOS PCT % 2  --  4    < > = values in this interval not displayed. Results from last 7 days   Lab Units 10/18/23  0622   SODIUM mmol/L 140   POTASSIUM mmol/L 3.9   CHLORIDE mmol/L 107   CO2 mmol/L 30   BUN mg/dL 11   CREATININE mg/dL 0.82   ANION GAP mmol/L 3   CALCIUM mg/dL 8.9   ALBUMIN g/dL 4.1   TOTAL BILIRUBIN mg/dL 2.54*   ALK PHOS U/L 27*   ALT U/L 14   AST U/L 36   GLUCOSE RANDOM mg/dL 114     Results from last 7 days   Lab Units 10/17/23  0319   INR  1.09     Results from last 7 days   Lab Units 10/18/23  1218 10/18/23  0638 10/18/23  0009 10/17/23  1808   POC GLUCOSE mg/dl 121 112 134 138         Results from last 7 days   Lab Units 10/18/23  0622 10/17/23  0319   LACTIC ACID mmol/L  --  1.7   PROCALCITONIN ng/ml 0.09  --        Lines/Drains:  Invasive Devices       Peripheral Intravenous Line  Duration             Peripheral IV 10/17/23 Left Arm 1 day    Peripheral IV 10/17/23 Right Hand 1 day                    Recent Cultures (last 7 days):   Results from last 7 days   Lab Units 10/18/23  0021   BLOOD CULTURE  Received in Microbiology Lab. Culture in Progress. Received in Microbiology Lab. Culture in Progress. Last 24 Hours Medication List:   Current Facility-Administered Medications   Medication Dose Route Frequency Provider Last Rate    acetaminophen  650 mg Oral Q6H PRN YUE Ulrich      pravastatin  80 mg Oral Daily With [address] Sangeetha PA-C          Today, Patient Was Seen By: Abhishek Epstein    **Please Note: This note may have been constructed using a voice recognition system. **

## 2023-10-18 NOTE — ASSESSMENT & PLAN NOTE
Lab Results   Component Value Date    HGBA1C 7.3 (H) 10/10/2023     -Will discontinue metformin and actos while in hospital

## 2023-10-19 ENCOUNTER — TELEPHONE (OUTPATIENT)
Dept: HEMATOLOGY ONCOLOGY | Facility: CLINIC | Age: 68
End: 2023-10-19

## 2023-10-19 PROCEDURE — 88305 TISSUE EXAM BY PATHOLOGIST: CPT | Performed by: SPECIALIST

## 2023-10-19 NOTE — TELEPHONE ENCOUNTER
I called Faye in response to a referral that was received for patient to establish care with Hematology. Outreach was made to schedule a consultation. The patient's line continuously rang and did not go to voicemail. Another attempt will be made to contact patient.

## 2023-10-20 LAB
ABO GROUP BLD BPU: NORMAL
ABO GROUP BLD BPU: NORMAL
BPU ID: NORMAL
BPU ID: NORMAL
CROSSMATCH: NORMAL
CROSSMATCH: NORMAL
GLUCOSE SERPL-MCNC: 100 MG/DL (ref 65–140)
GLUCOSE SERPL-MCNC: 97 MG/DL (ref 65–140)
UNIT DISPENSE STATUS: NORMAL
UNIT DISPENSE STATUS: NORMAL
UNIT PRODUCT CODE: NORMAL
UNIT PRODUCT CODE: NORMAL
UNIT PRODUCT VOLUME: 350 ML
UNIT PRODUCT VOLUME: 350 ML
UNIT RH: NORMAL
UNIT RH: NORMAL

## 2023-10-22 LAB
BACTERIA BLD CULT: NORMAL
BACTERIA BLD CULT: NORMAL

## 2023-10-23 LAB
BACTERIA BLD CULT: NORMAL
BACTERIA BLD CULT: NORMAL

## 2023-10-25 ENCOUNTER — TELEPHONE (OUTPATIENT)
Dept: HEMATOLOGY ONCOLOGY | Facility: CLINIC | Age: 68
End: 2023-10-25

## 2023-10-25 NOTE — TELEPHONE ENCOUNTER
I called Faye in response to a referral that was received for patient to establish care with Hematology. Outreach was made to schedule a consultation. The patient's phone rings and does not go to voicemail. Another attempt will be made to contact patient.

## 2023-10-26 ENCOUNTER — TELEPHONE (OUTPATIENT)
Dept: HEMATOLOGY ONCOLOGY | Facility: CLINIC | Age: 68
End: 2023-10-26

## 2023-10-26 NOTE — TELEPHONE ENCOUNTER
I called Faye in response to a referral that was received for patient to establish care with Hematology. Outreach was made to schedule a consultation. Patient does not have a voicemail set up. This is the third attempt to schedule patient unsuccessfully. The referral has been closed, a [employer] message has been sent to patient (if applicable) and a letter has been sent to the address on file.

## 2024-10-16 ENCOUNTER — PREP FOR PROCEDURE (OUTPATIENT)
Age: 69
End: 2024-10-16

## 2024-10-16 ENCOUNTER — TELEPHONE (OUTPATIENT)
Age: 69
End: 2024-10-16

## 2024-10-16 DIAGNOSIS — Z12.11 SCREENING FOR COLON CANCER: Primary | ICD-10-CM

## 2024-10-16 NOTE — TELEPHONE ENCOUNTER
Pt got a letter to schedule her recall, she has a scope last year but was not prepped.   I spoke with Pt in detail and I don't think she will need a 2 day, the problem was she did not prep at all because she didn't understand she needed to fast or go buy meds OTC so she didn't take anything at all before her scope.   I went over each step of the prep with her and the diet restrictions and she understands why she has to repeat and that the prep is very important this time around. She took notes but I told her we would also send her instructions, she would like them postal mailed (address on file verified) as she is not computer savvy.   She is asking if we can send a prescription to the [facility] on file as well for the Miralax and dulcolax, she says it will be much easier if she can just go in and have them give her what to take. I also think it would be best if someone calls her the week of her proc to go over the prep and diet again, she was very confused and we needed to go over it several times before she was confident so she may need a refresher before she has to actually take everything.

## 2024-10-16 NOTE — TELEPHONE ENCOUNTER
Scheduled date of colonoscopy (as of today):10.30.24    Physician performing colonoscopy:Jonathan    Location of colonoscopy:[place]    Bowel prep reviewed with patient:Miguel/Ngozi    Instructions reviewed with patient by:Lsims and needs postal mail

## 2024-10-16 NOTE — TELEPHONE ENCOUNTER
Screened by: Oscar Lopez    Referring Provider     Pre- Screening:     There is no height or weight on file to calculate BMI.  Has patient been referred for a routine screening Colonoscopy? no  Is the patient between 45-75 years old? yes      Previous Colonoscopy yes   If yes:    Date: 2023    Facility: [facility]    Has the patient been hospitalized or had abdominal surgery in the past 6 months? no    Does the patient use supplemental oxygen? no    Does the patient take Coumadin, Lovenox, Plavix, Elliquis, Xarelto, or other blood thinning medication? no    Has the patient had a stroke, cardiac event, or stent placed in the past year? no    If patient is between 45yrs - 49yrs, please advise patient that we will have to confirm benefits & coverage with their insurance company for a routine screening colonoscopy.

## 2024-10-21 ENCOUNTER — TELEPHONE (OUTPATIENT)
Age: 69
End: 2024-10-21

## 2024-10-21 NOTE — TELEPHONE ENCOUNTER
Patient contacted office confused about prep.     Verified patient must complete [employer] Prep. Prep directions sent via [employer].   Attempted to verbally go over all instructions with patient, during directions patient stated she had to go and hung up. She said she will call back.

## 2024-10-25 NOTE — TELEPHONE ENCOUNTER
Pt calling to see if she would be responsible for any out of pocket costs. Advised pt to reach out to her insurance, pt confirmed understanding.

## 2024-10-28 ENCOUNTER — TELEPHONE (OUTPATIENT)
Age: 69
End: 2024-10-28

## 2024-10-28 NOTE — TELEPHONE ENCOUNTER
Patients daughter called advised procedure was canceled due to insurance, she will call and find out more info thank you

## 2024-10-28 NOTE — TELEPHONE ENCOUNTER
Per our PEC team, ptn procedure was not authorized. It was not deemed medically necessary and the facility is [facility] with the ptn having no OON benefits. lm for the ptn canceling the procedure